# Patient Record
Sex: MALE | NOT HISPANIC OR LATINO | Employment: OTHER | URBAN - METROPOLITAN AREA
[De-identification: names, ages, dates, MRNs, and addresses within clinical notes are randomized per-mention and may not be internally consistent; named-entity substitution may affect disease eponyms.]

---

## 2017-01-10 DIAGNOSIS — Z98.1 STATUS POST ARTHRODESIS: Primary | ICD-10-CM

## 2017-01-12 ENCOUNTER — OFFICE VISIT (OUTPATIENT)
Dept: ORTHOPEDICS | Facility: CLINIC | Age: 62
End: 2017-01-12

## 2017-01-12 VITALS — WEIGHT: 266.3 LBS | BODY MASS INDEX: 36.07 KG/M2 | HEIGHT: 72 IN

## 2017-01-12 DIAGNOSIS — Z98.1 STATUS POST ARTHRODESIS: Primary | ICD-10-CM

## 2017-01-12 NOTE — MR AVS SNAPSHOT
After Visit Summary   1/12/2017    Felice Boyle    MRN: [id number]           Patient Information     Date Of Birth          1955        Visit Information        Provider Department      1/12/2017 7:40 AM Elias Quiñonez MD [facility]         Follow-ups after your visit        Your next 10 appointments already scheduled     Feb 17, 2017  8:30 AM   (Arrive by 8:15 AM)   MR THORACIC SPINE W/O CONTRAST with [id number]   [facility] ([facility])    [address] [address] [phone]           Take your medicines as usual, unless your doctor tells you not to. Bring a list of your current medicines to your exam (including vitamins, minerals and over-the-counter drugs). Also bring the results of similar scans you may have had.  Please remove any body piercings and hair extensions before you arrive.  Follow your doctor s orders. If you do not, we may have to postpone your exam.  You will not have contrast for this exam. You do not need to do anything special to prepare.  The MRI machine uses a strong magnet. Please wear clothes without metal (snaps, zippers). A sweatsuit works well, or we may give you a hospital gown.   **IMPORTANT** THE INSTRUCTIONS BELOW ARE ONLY FOR THOSE PATIENTS WHO HAVE BEEN TOLD THEY WILL RECEIVE SEDATION OR GENERAL ANESTHESIA DURING THEIR MRI PROCEDURE:  IF YOU WILL RECEIVE SEDATION (take medicine to help you relax during your exam):   You must get the medicine from your doctor before you arrive. Bring the medicine to the exam. Do not take it at home.   Arrive one hour early. Bring someone who can take you home after the test. Your medicine will make you sleepy. After the exam, you may not drive, take a bus or take a taxi by yourself.   No eating 8 hours before your exam. You may have clear liquids up until 4 hours before your exam. (Clear liquids include water, clear tea,  black coffee and fruit juice without pulp.)  IF YOU WILL RECEIVE ANESTHESIA (be asleep for your exam):   Arrive 1 1/2 hours early. Bring someone who can take you home after the test. You may not drive, take a bus or take a taxi by yourself.   No eating 8 hours before your exam. You may have clear liquids up until 4 hours before your exam. (Clear liquids include water, clear tea, black coffee and fruit juice without pulp.)   You will spend four to five hours in the recovery room.  Please call the Imaging Department at your exam site with any questions.            Feb 17, 2017  9:20 AM   (Arrive by 9:05 AM)   CT CHEST W/O CONTRAST with UCCT1   [facility] CT ([facility])    [address] [place] [address] [phone]           Please bring any scans or X-rays taken at other hospitals, if similar tests were done. Also bring a list of your medicines, including vitamins, minerals and over-the-counter drugs. It is safest to leave personal items at home.  Be sure to tell your doctor:   If you have any allergies.   If there s any chance you are pregnant.   If you are breastfeeding.   If you have any special needs.  You do not need to do anything special to prepare.  Please wear loose clothing, such as a sweat suit or jogging clothes. Avoid snaps, zippers and other metal. We may ask you to undress and put on a hospital gown.            Feb 17, 2017 10:00 AM   (Arrive by 9:45 AM)   Return Visit with Jeremy Walker MD   [facility] ([facility])    [address] [place] [id number] [phone]            Apr 18, 2017  7:40 AM   (Arrive by 7:10 AM)   RETURN SPINE with Elias Quiñonez MD   [facility] ([facility])    [address] [place] [id number] [phone]              Who to contact     Please call your  "clinic at [phone] to:    Ask questions about your health    Make or cancel appointments    Discuss your medicines    Learn about your test results    Speak to your doctor   If you have compliments or concerns about an experience at your clinic, or if you wish to file a complaint, please contact [facility] Patient Relations at [phone] or email us at [email or web address]         Additional Information About Your Visit        MyChart Information     [employer]ok[employer] gives you secure access to your electronic health record. If you see a primary care provider, you can also send messages to your care team and make appointments. If you have questions, please call your primary care clinic.  If you do not have a primary care provider, please call [phone] and they will assist you.      [employer] is an electronic gateway that provides easy, online access to your medical records. With [employer], you can request a clinic appointment, read your test results, renew a prescription or communicate with your care team.     To access your existing account, please contact your [facility] or call [phone] for assistance.        Care EveryWhere ID     This is your Care EveryWhere ID. This could be used by other organizations to access your [place] medical records  [id number]        Your Vitals Were     Height BMI (Body Mass Index)                1.816 m (5' 11.5\") 36.63 kg/m2           Blood Pressure from Last 3 Encounters:   11/16/16 134/87   10/14/16 135/76   10/06/16 150/100    Weight from Last 3 Encounters:   01/12/17 120.793 kg (266 lb 4.8 oz)   12/01/16 121.383 kg (267 lb 9.6 oz)   11/16/16 119.886 kg (264 lb 4.8 oz)              Today, you had the following     No orders found for display       Primary Care Provider Office Phone # Fax #    Saul Hahn [phone]       [facility] [address]  [facility] " [place] [address]        Thank you!     Thank you for choosing [facility]  for your care. Our goal is always to provide you with excellent care. Hearing back from our patients is one way we can continue to improve our services. Please take a few minutes to complete the written survey that you may receive in the mail after your visit with us. Thank you!             Your Updated Medication List - Protect others around you: Learn how to safely use, store and throw away your medicines at www.disposemymeds.org.      Notice  As of 1/12/2017  9:03 AM    You have not been prescribed any medications.

## 2017-01-12 NOTE — Clinical Note
Return to Work  2017     Seen today: yes    Patient:  Felice Boyle  :   1955  MRN:     [id number]  Physician: GLADIS QUIÑONEZ    Felice Boyle may return to work on Date: 2017.      The next clinic appointment is scheduled for (date/time) 3 months (6 months postoperative).    Patient limitations:    Patient now 3 months s/p tumor resection, spinal fusion T1-T3.  Doing very well.  He is now cleared to go back to work, full time and full work activities.  No restrictions.          Electronically signed by Gladis Quiñonez MD

## 2017-01-12 NOTE — Clinical Note
1/12/2017       RE: Felice Boyle  [address] [facility] [address]     Dear Colleague,    Thank you for referring your patient, Felice Boyle, to the [facility] at [facility]. Please see a copy of my visit note below.    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  Last seen at 6 wks postop.    Accompanied by his wife.  Continues to do very well.  No complaints.  Off pain meds.  Neuro intact.  Looking forward to return to work as .    Back pain 0, leg pain 0.  RICHARDSON 0/50 or 0%.  PROMIS physical 19, mental 20, total 39.    O> Neuro intact.  Ambulates independently.  Surg incision fully healed, no sign of infection.    Imaging: thoracic AP-Lat-Swimmer's show intact stable posterior fixation T2-T4.    A> 3 mos postop, doing very well.    P> Congratulated and reassured patient.    May return to work from ortho spine standpoint.  Full activities as tolerated.  RTC 3 mos (6 mos postop), with rpt thoracic ap-lat x-rays.    Elias Quiñonez MD

## 2017-01-12 NOTE — NURSING NOTE
"Reason For Visit:   Chief Complaint   Patient presents with     Surgical Followup     s/p O-Arm/Stealth Assisted Partial Vertical Body Thoracic 3, Partial spinal resection - T3 left hemilamina, pedicle, transverse process, partial vertebral body, Spinal reconstruction( s/p tumor resection) - posterior instrumented spinal fusion T2-T4, use of allograft on 10/10/2016       Primary MD: Saul Hahn  Ref. MD: Dr. Walker    ?  No  Currently working? No.  Work status?  On medical leave.  Date of injury: none  Date of surgery: 10/10/2016  Type of surgery: O-Arm/Stealth Assisted Partial Vertical Body Thoracic 3, Partial spinal resection - T3 left hemilamina, pedicle, transverse process, partial vertebral body, Spinal reconstruction( s/p tumor resection) - posterior instrumented spinal fusion T2-T4, use of allograft.  Smoker: No      Ht 1.816 m (5' 11.5\")  Wt 120.793 kg (266 lb 4.8 oz)  BMI 36.63 kg/m2    Pain Assessment  Patient Currently in Pain: No    Oswestry (RICHARDSON) Questionnaire    OSWESTRY DISABILITY INDEX 1/11/2017   SECTION 1-PAIN INTENSITY 0  I have no pain at the moment.   SECTION 2-PERSONAL CARE (WASHING,DRESSING,ETC.) 0  I can look after myself normally without causing additional pain.   SECTION 3-LIFTING 0  I can lift heavy weights without additional pain.   SECTION 4-WALKING 0  Pain does not prevent me from walking any distance.   SECTION 5-SITTING 0  I can sit in any chair as long as I like   SECTION 6-STANDING 0  I can stand as long as I want without additional pain.   SECTION 7-SLEEPING 0  My sleep is never interrupted by pain.   SECTION 8-SEX LIFE (IF APPLICABLE) 0  My sex life is normal and causes no additional pain.   SECTION 9-SOCIAL LIFE 0  My social life is normal and causes me no additional pain.   SECTION 10-TRAVELING 0  I can travel anywhere without pain.   Oswestry Disability Index: Count 10   RICHARDSON: Total Score = SUM (total for answered questions) 0   Computed Oswestry Score 0 (%) "            Numeric Rating Scale:  VAS Scores     VAS Survey 1/11/2017   What is your level of back pain during the last week: 0   What is your level of RIGHT leg pain during the last week: 0   What is your level of LEFT leg pain during the last week: 0   What is your level of neck pain during the last week: 0   What is your level of RIGHT arm pain during the last week: 0   What is your level of LEFT arm pain during the last week: 0            Promis 10 Assessment    PROMIS 10 1/11/2017   In general, would you say your health is: Very Good = 4   In general, would you say your quality of life is: Excellent = 5   In general, how would you rate your physical health? Very good = 4   In general, how would you rate your mental health, including your mood and your ability to think? Excellent = 5   In general, how would you rate your satisfaction with your social activities and relationships? Excellent = 5   In general, please rate how well you carry out your usual social activities and roles Excellent = 5   To what extent are you able to carry out your everyday physical activities such as walking, climbing stairs, carrying groceries, or moving a chair? Completely = 5   How often have you been bothered by emotional problems such as feeling anxious, depressed or irritable? Never = 5   How would you rate your fatigue on average? None = 5   How would you rate your pain on average?   0 = No Pain  to  10 = Worst Imaginable Pain 0   Global Physical Health Score : Raw Score 19 (SUM : G03 - G06 - G07 - G08)   Global Mentall Health Score : Raw Score 20 (SUM : G02 - G04 - G05 - G10)   Total (Physical + Mental Health Score) 39

## 2017-01-15 NOTE — PROGRESS NOTES
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  Last seen at 6 wks postop.    Accompanied by his wife.  Continues to do very well.  No complaints.  Off pain meds.  Neuro intact.  Looking forward to return to work as .    Back pain 0, leg pain 0.  RICHARDSON 0/50 or 0%.  PROMIS physical 19, mental 20, total 39.    O> Neuro intact.  Ambulates independently.  Surg incision fully healed, no sign of infection.    Imaging: thoracic AP-Lat-Swimmer's show intact stable posterior fixation T2-T4.    A> 3 mos postop, doing very well.    P> Congratulated and reassured patient.    May return to work from ortho spine standpoint.  Full activities as tolerated.  RTC 3 mos (6 mos postop), with rpt thoracic ap-lat x-rays.

## 2017-02-17 ENCOUNTER — OFFICE VISIT (OUTPATIENT)
Dept: ORTHOPEDICS | Facility: CLINIC | Age: 62
End: 2017-02-17

## 2017-02-17 ENCOUNTER — TELEPHONE (OUTPATIENT)
Dept: ORTHOPEDICS | Facility: CLINIC | Age: 62
End: 2017-02-17

## 2017-02-17 VITALS
HEIGHT: 72 IN | BODY MASS INDEX: 33.51 KG/M2 | DIASTOLIC BLOOD PRESSURE: 95 MMHG | SYSTOLIC BLOOD PRESSURE: 148 MMHG | WEIGHT: 247.4 LBS

## 2017-02-17 DIAGNOSIS — C41.3 CHONDROSARCOMA OF RIBS (H): Primary | ICD-10-CM

## 2017-02-17 NOTE — MR AVS SNAPSHOT
After Visit Summary   2/17/2017    Felice Boyle    MRN: [id number]           Patient Information     Date Of Birth          1955        Visit Information        Provider Department      2/17/2017 10:00 AM Jeremy Walker MD [facility]        Today's Diagnoses     Chondrosarcoma of ribs (H)    -  1       Follow-ups after your visit        Your next 10 appointments already scheduled     Apr 18, 2017  7:40 AM CDT   (Arrive by 7:10 AM)   RETURN SPINE with Elias Quiñonez MD   [facility] ([facility])    [address] [place] [id number] [phone]            May 17, 2017  9:45 AM CDT   (Arrive by 9:30 AM)   Return Visit with Jeremy Walker MD   [facility] ([facility])    [address] [place] [id number] [phone]              Who to contact     Please call your clinic at [phone] to:    Ask questions about your health    Make or cancel appointments    Discuss your medicines    Learn about your test results    Speak to your doctor   If you have compliments or concerns about an experience at your clinic, or if you wish to file a complaint, please contact [facility] Patient Relations at [phone] or email us at [email or web address]         Additional Information About Your Visit        MyChart Information     [id number] gives you secure access to your electronic health record. If you see a primary care provider, you can also send messages to your care team and make appointments. If you have questions, please call your primary care clinic.  If you do not have a primary care provider, please call [phone] and they will assist you.      [employer] is an electronic gateway that provides easy, online access to your medical records. With [employer], you can request a clinic  "appointment, read your test results, renew a prescription or communicate with your care team.     To access your existing account, please contact your [facility] or call [phone] for assistance.        Care EveryWhere ID     This is your Care EveryWhere ID. This could be used by other organizations to access your [place] medical records  [id number]        Your Vitals Were     Height BMI (Body Mass Index)                1.816 m (5' 11.5\") 34.02 kg/m2           Blood Pressure from Last 3 Encounters:   02/17/17 (!) 148/95   11/16/16 134/87   10/14/16 135/76    Weight from Last 3 Encounters:   02/17/17 112.2 kg (247 lb 6.4 oz)   01/12/17 120.8 kg (266 lb 4.8 oz)   12/01/16 121.4 kg (267 lb 9.6 oz)              Today, you had the following     No orders found for display       Primary Care Provider Office Phone # Fax #    Saul Hahn [phone]       [address] [facility] [place] [address] [place] [address]        Thank you!     Thank you for choosing [facility]  for your care. Our goal is always to provide you with excellent care. Hearing back from our patients is one way we can continue to improve our services. Please take a few minutes to complete the written survey that you may receive in the mail after your visit with us. Thank you!             Your Updated Medication List - Protect others around you: Learn how to safely use, store and throw away your medicines at www.disposemymeds.org.      Notice  As of 2/17/2017 10:24 AM    You have not been prescribed any medications.      "

## 2017-02-17 NOTE — LETTER
2/17/2017       RE: Felice Boyle  [address]  [facility] [address]     Dear Colleague,    Thank you for referring your patient, Felice Boyle, to the [facility] at [facility]. Please see a copy of my visit note below.    I was present with the PA during the history and exam.  I discussed the case with the PA and agree with the findings as documented in the assessment and plan.      OUTPATIENT PROGRESS NOTE      CHIEF COMPLAINT:  Followup history of T3 posterior rib grade 2 chondrosarcoma.      HISTORY OF PRESENT ILLNESS:  Felice is a 62-year-old male who is here with his wife today for discussion of his followup upper thoracic MRI and chest CT for monitoring of a T3 left posterior rib chondrosarcoma, grade 2.  On 10/10/2016, Moe had a left lateral third rib resection with partial T3 resection and T2 through T4 spinal fusion with allograft by Dr. Quiñonez and Dr. Walker.  The patient reports that he is doing well.  He has no pain.  His incision is well-healed.  He is not taking any pain medicine.  He has been performing all activities of daily living without difficulty.  He no longer has any lifting restrictions.  He works as a  for [employer], and has been off work since 08/2016.  He is very anxious to get back to work, and feels he is ready to do so.  There were some concerns in the hospital regarding sleep apnea and possible hypertension.  Felice reports that he has been taking his blood pressure at home, and it has been within the normal range.  He denies any snoring or gasping at night.  His wife is with him as well, and verifies that he sleeps quite well.  He denies any daytime somnolence.  His BMI today is 34.1, which is down from a BMI of 37 back in November.  Moe reports no new hospitalizations or illnesses.  He is feeling healthy and well.  He is anxious to hear the results of his scans today.  No other concerns.      PHYSICAL EXAMINATION:   Moe is a healthy-appearing 62-year-old male, alert and oriented, in no apparent distress.  We did get a blood pressure on him today, which was 148/95.  His incision is well-healed, nontender.  He has full range of motion of his extremities and neck without difficulty.  He is neurovascularly intact.      IMAGING:  CT scan of the chest performed today shows postoperative changes of resection of the posterior medial left third rib and spinal fusion hardware at T3 through T5.  No acute or aggressive appearing osseous lesion identified.  There is also a technically indeterminate 2 mm pulmonary nodule in the left lower lobe.  They recommend short-term interval followup in 3 months to confirm the stability of this nodule.      Spine MRI without contrast was also obtained today.  These images were reviewed by Dr. Walker, and official radiology read is still pending.  This shows post-surgical changes and scar around the area of the T2-T4 fusion.  No sign of recurrence of the chondrosarcoma at this point.      IMPRESSION:   1.  A 62-year-old male doing very well status post T2-T4 spinal fusion for grade 2 T3 posterior rib chondrosarcoma.   2.  Concerns of possible hypertension.   3.  No clinical signs of sleep apnea.   4.  Overweight.   5.  No current evidence of recurrence of chondrosarcoma.      PLAN:  Moe is doing very well after his surgeries.  We are very happy with his progress.  He has no restrictions, in terms of lifting or activities from our standpoint.  We would release him back to work pending his government appointed physical exam.      I see no clinical signs of sleep apnea today, other than he is overweight.  He has no other symptoms that would correspond to this.  With regards to the possibility of hypertension, typically it takes 3 blood pressure readings to diagnose that.  We have one today that is elevated.  I do recommend he follow up with his primary care physician to further assess that and treat if  needed.      Moe will follow up in 3 months for a repeat CT of the chest and repeat MRI of his spine, which is protocol.  He will continue to follow up at regular intervals up until 5 years post-surgery.  We have filled out the appropriate forms regarding his return to work.  They can call with any concerns or questions.  Dr. Walker examined this patient as well, and agrees with the plan of care.           Again, thank you for allowing me to participate in the care of your patient.      Sincerely,    Jeremy Walker MD

## 2017-02-17 NOTE — NURSING NOTE
"Reason For Visit:   Chief Complaint   Patient presents with     Surgical Followup     S/P OPTICAL TRACKING SYSTEM FUSION POSTERIOR SPINE THORACIC THREE+ LEVELS. DOS: 10/10/2016.     RECHECK     Same Day MRI and CT Scan.        Pain Assessment  Patient Currently in Pain: No               HEIGHT: 5' 11.5\", WEIGHT: 247 lbs 6.4 oz, BMI: Body mass index is 34.02 kg/(m^2).      No current outpatient prescriptions on file.        No Known Allergies    "

## 2017-02-17 NOTE — PROGRESS NOTES
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      HISTORY OF PRESENT ILLNESS:  Felice is a 62-year-old male who is here with his wife today for discussion of his followup upper thoracic MRI and chest CT for monitoring of a T3 left posterior rib chondrosarcoma, grade 2.  On 10/10/2016, Moe had a left lateral third rib resection with partial T3 resection and T2 through T4 spinal fusion with allograft by Dr. Quiñonez and Dr. Walker.  The patient reports that he is doing well.  He has no pain.  His incision is well-healed.  He is not taking any pain medicine.  He has been performing all activities of daily living without difficulty.  He no longer has any lifting restrictions.  He works as a  for [employer], and has been off work since 08/2016.  He is very anxious to get back to work, and feels he is ready to do so.  There were some concerns in the hospital regarding sleep apnea and possible hypertension.  Felice reports that he has been taking his blood pressure at home, and it has been within the normal range.  He denies any snoring or gasping at night.  His wife is with him as well, and verifies that he sleeps quite well.  He denies any daytime somnolence.  His BMI today is 34.1, which is down from a BMI of 37 back in November.  Moe reports no new hospitalizations or illnesses.  He is feeling healthy and well.  He is anxious to hear the results of his scans today.  No other concerns.      PHYSICAL EXAMINATION:  Moe is a healthy-appearing 62-year-old male, alert and oriented, in no apparent distress.  We did  get a blood pressure on him today, which was 148/95.  His incision is well-healed, nontender.  He has full range of motion of his extremities and neck without difficulty.  He is neurovascularly intact.      IMAGING:  CT scan of the chest performed today shows postoperative changes of resection of the posterior medial left third rib and spinal fusion hardware at T3 through T5.  No acute or aggressive appearing osseous lesion identified.  There is also a technically indeterminate 2 mm pulmonary nodule in the left lower lobe.  They recommend short-term interval followup in 3 months to confirm the stability of this nodule.      Spine MRI without contrast was also obtained today.  These images were reviewed by Dr. Walker, and official radiology read is still pending.  This shows post-surgical changes and scar around the area of the T2-T4 fusion.  No sign of recurrence of the chondrosarcoma at this point.      IMPRESSION:   1.  A 62-year-old male doing very well status post T2-T4 spinal fusion for grade 2 T3 posterior rib chondrosarcoma.   2.  Concerns of possible hypertension.   3.  No clinical signs of sleep apnea.   4.  Overweight.   5.  No current evidence of recurrence of chondrosarcoma.      PLAN:  Moe is doing very well after his surgeries.  We are very happy with his progress.  He has no restrictions, in terms of lifting or activities from our standpoint.  We would release him back to work pending his government appointed physical exam.      I see no clinical signs of sleep apnea today, other than he is overweight.  He has no other symptoms that would correspond to this.  With regards to the possibility of hypertension, typically it takes 3 blood pressure readings to diagnose that.  We have one today that is elevated.  I do recommend he follow up with his primary care physician to further assess that and treat if needed.      Moe will follow up in 3 months for a repeat CT of the chest and repeat MRI of his  spine, which is protocol.  He will continue to follow up at regular intervals up until 5 years post-surgery.  We have filled out the appropriate forms regarding his return to work.  They can call with any concerns or questions.  Dr. Walker examined this patient as well, and agrees with the plan of care.

## 2017-03-16 ENCOUNTER — TELEPHONE (OUTPATIENT)
Dept: ORTHOPEDICS | Facility: CLINIC | Age: 62
End: 2017-03-16

## 2017-03-16 NOTE — TELEPHONE ENCOUNTER
"----- Message from Ella Harry PA-C sent at 3/16/2017 11:45 AM CDT -----  Regarding: RE: Letter needed ASAP  Contact: [phone]  This letter should come from Dr. Trujillo at [facility], regarding the treatment and follow-up for his prostate nodules, as Dr. Walker is not following the prostate.  If Felice has not seen Dr. Trujillo, he should do so on recommendation from Dr. Lawrence originally.  Thank you.  Beti Harry PA-C      ----- Message -----     From: Amberly Patel RN     Sent: 3/15/2017  11:26 AM       To: Jeremy Walker MD, #  Subject: Letter needed ASAP                               Felice Dixon is requesting a letter addressing nodules on his prostate, \"on how critical it is and how it will be monitored\".  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  After this letter is received, it will be an additional 6 weeks until he is cleared for flying so timeliness is important.  Thank-you,  Amberly    "

## 2017-03-16 NOTE — TELEPHONE ENCOUNTER
Spoke with Felice, he should be contacting Dr. Trujillo to request letter and determine follow-up for prostate nodules.  He will do so.  We will fax the information we have regarding our follow-up and the information from Dr. Lawrence.  We will continue to follow Felice for his lung nodules related to his sarcoma follow-up.  All questions answered.

## 2017-03-30 DIAGNOSIS — C41.9 CHONDROSARCOMA (H): Primary | ICD-10-CM

## 2017-04-17 DIAGNOSIS — Z98.890 S/P SPINAL SURGERY: Primary | ICD-10-CM

## 2017-04-18 ENCOUNTER — OFFICE VISIT (OUTPATIENT)
Dept: ORTHOPEDICS | Facility: CLINIC | Age: 62
End: 2017-04-18

## 2017-04-18 VITALS — WEIGHT: 231.8 LBS | BODY MASS INDEX: 31.4 KG/M2 | HEIGHT: 72 IN

## 2017-04-18 DIAGNOSIS — Z98.1 STATUS POST ARTHRODESIS: Primary | ICD-10-CM

## 2017-04-18 NOTE — LETTER
4/18/2017       RE: Felice Boyle  [address] [facility] [address]     Dear Colleague,    Thank you for referring your patient, Felice Boyle, to the [facility] at [facility]. Please see a copy of my visit note below.    HISTORY OF PRESENT ILLNESS:  62-year-old male 6 mos s/p resection of left 3rd rib chondrosarcoma, PISF T2-T4 on 10/10/2016; last seen at 3 mos postop.      Accompanied by his wife.  He is very happy to report that he continues to do very well.  He is very much recovered from surgery.  He is very happy with surgical outcome, no sign of tumor recurrence, no significant pain symptoms.  No new complaints.  On his last visit, I cleared him to go back to work.  He has not yet gone back to work but mainly because he has not yet received the approval to go back to work from his employer.  He said it is still under review by the [employer].  He works as a .      RICHARDSON is 0%.  Back pain 0, right and left leg pain 0.  Physical score 19, mental score 20, total score 39.      PHYSICAL EXAMINATION:  On exam, patient is very pleasant, healthy-appearing, alert, oriented x3 and cooperative.  Not in cardiorespiratory distress.  BMI 31.9.  Stands and ambulates independently, normal gait, no antalgia, no imbalance.  Surgical incisions are fully healed, no sign of infection, no tenderness.  He is neurologically intact for all 4 extremities.      IMAGING:  Thoracic spine, AP, lateral x-rays show intact stable posterior instrumented fusion at T2-T4.  No sign of failure or loosening of fixation.      IMPRESSION:  Six months postoperative, doing excellent.      PLAN:  I congratulated Mr. Boyle on his continued improvement.  He has an appointment coming up next month with Dr. Walker for his tumor status followup.  He will have advanced imaging at that time.  I would like to see him back in 6 months with a thoracic CT scan for fusion  assessment.  The patient expressed good understanding and agreement.      Total visit time greater than 10 minutes, more than half spent in counseling and coordination of care.           Again, thank you for allowing me to participate in the care of your patient.      Sincerely,    Elias Quiñonez MD

## 2017-04-18 NOTE — NURSING NOTE
"Reason For Visit:   Chief Complaint   Patient presents with     Surgical Followup      s/p resection of L posterior 3rd rib chondrosarcoma, PISF T2-T4 on 10/10/16.       Primary MD: Saul Hahn. MD: established    ?  No  Currently working? No.  Work status?  On medical leave.  Date of injury: none  Date of surgery: 10/10/2016  Type of surgery: O-Arm/Stealth Assisted Partial Vertical Body Thoracic 3, Partial spinal resection - T3 left hemilamina, pedicle, transverse process, partial vertebral body, Spinal reconstruction( s/p tumor resection) - posterior instrumented spinal fusion T2-T4, use of allograft.  Smoker: No  Ht 1.816 m (5' 11.5\")  Wt 105.1 kg (231 lb 12.8 oz)  BMI 31.88 kg/m2    Pain Assessment  Patient Currently in Pain: No    Oswestry (RICHARDSON) Questionnaire    OSWESTRY DISABILITY INDEX 4/17/2017   SECTION 1-PAIN INTENSITY 0  I have no pain at the moment.   SECTION 2-PERSONAL CARE (WASHING,DRESSING,ETC.) 0  I can look after myself normally without causing additional pain.   SECTION 3-LIFTING 0  I can lift heavy weights without additional pain.   SECTION 4-WALKING 0  Pain does not prevent me from walking any distance.   SECTION 5-SITTING 0  I can sit in any chair as long as I like   SECTION 6-STANDING 0  I can stand as long as I want without additional pain.   SECTION 7-SLEEPING 0  My sleep is never interrupted by pain.   SECTION 8-SEX LIFE (IF APPLICABLE) 0  My sex life is normal and causes no additional pain.   SECTION 9-SOCIAL LIFE 0  My social life is normal and causes me no additional pain.   SECTION 10-TRAVELING 0  I can travel anywhere without pain.   Oswestry Disability Index: Count 10   RICHARDSON: Total Score = SUM (total for answered questions) 0   Computed Oswestry Score 0 (%)        Numeric Rating Scale:  VAS Scores     VAS Survey 4/17/2017   What is your level of back pain during the last week: 0   What is your level of RIGHT leg pain during the last week: 0   What is your level of " LEFT leg pain during the last week: 0   What is your level of neck pain during the last week: 0   What is your level of RIGHT arm pain during the last week: 0   What is your level of LEFT arm pain during the last week: 0        Promis 10 Assessment    PROMIS 10 4/17/2017   In general, would you say your health is: Excellent = 5   In general, would you say your quality of life is: Excellent = 5   In general, how would you rate your physical health? Very good = 4   In general, how would you rate your mental health, including your mood and your ability to think? Excellent = 5   In general, how would you rate your satisfaction with your social activities and relationships? Excellent = 5   In general, please rate how well you carry out your usual social activities and roles Excellent = 5   To what extent are you able to carry out your everyday physical activities such as walking, climbing stairs, carrying groceries, or moving a chair? Completely = 5   How often have you been bothered by emotional problems such as feeling anxious, depressed or irritable? Never = 5   How would you rate your fatigue on average? None = 5   How would you rate your pain on average?   0 = No Pain  to  10 = Worst Imaginable Pain 0   Global Physical Health Score : Raw Score 19 (SUM : G03 - G06 - G07 - G08)   Global Mentall Health Score : Raw Score 20 (SUM : G02 - G04 - G05 - G10)   Total (Physical + Mental Health Score) 39

## 2017-04-18 NOTE — MR AVS SNAPSHOT
After Visit Summary   4/18/2017    Felice Boyle    MRN: [id number]           Patient Information     Date Of Birth          1955        Visit Information        Provider Department      4/18/2017 7:40 AM Elias Quiñonez MD [facility]        Today's Diagnoses     s/p PISF T2-T4    -  1       Follow-ups after your visit        Follow-up notes from your care team     Return in about 6 months (around 10/18/2017).      Your next 10 appointments already scheduled     May 17, 2017  8:00 AM CDT   (Arrive by 7:45 AM)   CT CHEST W/O CONTRAST with [id number]   [facility] CT ([facility])    [address] [address] [phone]           Please bring any scans or X-rays taken at other hospitals, if similar tests were done. Also bring a list of your medicines, including vitamins, minerals and over-the-counter drugs. It is safest to leave personal items at home.  Be sure to tell your doctor:   If you have any allergies.   If there s any chance you are pregnant.   If you are breastfeeding.   If you have any special needs.  You do not need to do anything special to prepare.  Please wear loose clothing, such as a sweat suit or jogging clothes. Avoid snaps, zippers and other metal. We may ask you to undress and put on a hospital gown.            May 17, 2017  8:30 AM CDT   (Arrive by 8:15 AM)   MR THORACIC SPINE W/O CONTRAST with [id number]   [facility] MRI ([facility])    [address] [place] [address] [phone]           Take your medicines as usual, unless your doctor tells you not to. Bring a list of your current medicines to your exam (including vitamins, minerals and over-the-counter drugs). Also bring the results of similar scans you may have had.  Please remove any body piercings and hair extensions before you arrive.  Follow your doctor s  orders. If you do not, we may have to postpone your exam.  You will not have contrast for this exam. You do not need to do anything special to prepare.  The MRI machine uses a strong magnet. Please wear clothes without metal (snaps, zippers). A sweatsuit works well, or we may give you a hospital gown.   **IMPORTANT** THE INSTRUCTIONS BELOW ARE ONLY FOR THOSE PATIENTS WHO HAVE BEEN TOLD THEY WILL RECEIVE SEDATION OR GENERAL ANESTHESIA DURING THEIR MRI PROCEDURE:  IF YOU WILL RECEIVE SEDATION (take medicine to help you relax during your exam):   You must get the medicine from your doctor before you arrive. Bring the medicine to the exam. Do not take it at home.   Arrive one hour early. Bring someone who can take you home after the test. Your medicine will make you sleepy. After the exam, you may not drive, take a bus or take a taxi by yourself.   No eating 8 hours before your exam. You may have clear liquids up until 4 hours before your exam. (Clear liquids include water, clear tea, black coffee and fruit juice without pulp.)  IF YOU WILL RECEIVE ANESTHESIA (be asleep for your exam):   Arrive 1 1/2 hours early. Bring someone who can take you home after the test. You may not drive, take a bus or take a taxi by yourself.   No eating 8 hours before your exam. You may have clear liquids up until 4 hours before your exam. (Clear liquids include water, clear tea, black coffee and fruit juice without pulp.)   You will spend four to five hours in the recovery room.  Please call the Imaging Department at your exam site with any questions.            May 17, 2017  9:45 AM CDT   (Arrive by 9:30 AM)   Return Visit with Jeremy Walker MD   [facility] ([facility])    [address] [address] [facility]  [address] [facility] [address] [phone]              Who to contact     Please call your clinic at [phone] to:    Ask questions about your health    Make or cancel  "appointments    Discuss your medicines    Learn about your test results    Speak to your doctor   If you have compliments or concerns about an experience at your clinic, or if you wish to file a complaint, please contact [facility] Patient Relations at [phone] or email us at [email or web address]         Additional Information About Your Visit        MyChart Information     The [employer] gives you secure access to your electronic health record. If you see a primary care provider, you can also send messages to your care team and make appointments. If you have questions, please call your primary care clinic.  If you do not have a primary care provider, please call [phone] and they will assist you.      [employer] is an electronic gateway that provides easy, online access to your medical records. With [employer], you can request a clinic appointment, read your test results, renew a prescription or communicate with your care team.     To access your existing account, please contact your [facility] or call [phone] for assistance.        Care EveryWhere ID     This is your Care EveryWhere ID. This could be used by other organizations to access your [place] medical records  [id number]        Your Vitals Were     Height BMI (Body Mass Index)                1.816 m (5' 11.5\") 31.88 kg/m2           Blood Pressure from Last 3 Encounters:   02/17/17 (!) 148/95   11/16/16 134/87   10/14/16 135/76    Weight from Last 3 Encounters:   04/18/17 105.1 kg (231 lb 12.8 oz)   02/17/17 112.2 kg (247 lb 6.4 oz)   01/12/17 120.8 kg (266 lb 4.8 oz)               Primary Care Provider Office Phone # Fax #    Saul Hahn [phone]       [facility] [address]  [facility] [address]        Thank you!     Thank you for choosing [facility]  for your care. Our goal is always to provide you with excellent " care. Hearing back from our patients is one way we can continue to improve our services. Please take a few minutes to complete the written survey that you may receive in the mail after your visit with us. Thank you!             Your Updated Medication List - Protect others around you: Learn how to safely use, store and throw away your medicines at www.disposemymeds.org.      Notice  As of 4/18/2017 11:59 PM    You have not been prescribed any medications.

## 2017-04-18 NOTE — LETTER
4/18/2017      RE: Felice Boyle  [address]  [facility] [address]       HISTORY OF PRESENT ILLNESS:  62-year-old male 6 mos s/p resection of left 3rd rib chondrosarcoma, PISF T2-T4 on 10/10/2016; last seen at 3 mos postop.      Accompanied by his wife.  He is very happy to report that he continues to do very well.  He is very much recovered from surgery.  He is very happy with surgical outcome, no sign of tumor recurrence, no significant pain symptoms.  No new complaints.  On his last visit, I cleared him to go back to work.  He has not yet gone back to work but mainly because he has not yet received the approval to go back to work from his employer.  He said it is still under review by the [employer].  He works as a .      RICHARDSON is 0%.  Back pain 0, right and left leg pain 0.  Physical score 19, mental score 20, total score 39.      PHYSICAL EXAMINATION:  On exam, patient is very pleasant, healthy-appearing, alert, oriented x3 and cooperative.  Not in cardiorespiratory distress.  BMI 31.9.  Stands and ambulates independently, normal gait, no antalgia, no imbalance.  Surgical incisions are fully healed, no sign of infection, no tenderness.  He is neurologically intact for all 4 extremities.      IMAGING:  Thoracic spine, AP, lateral x-rays show intact stable posterior instrumented fusion at T2-T4.  No sign of failure or loosening of fixation.      IMPRESSION:  Six months postoperative, doing excellent.      PLAN:  I congratulated Mr. Boyle on his continued improvement.  He has an appointment coming up next month with Dr. Walker for his tumor status followup.  He will have advanced imaging at that time.  I would like to see him back in 6 months with a thoracic CT scan for fusion assessment.  The patient expressed good understanding and agreement.      Total visit time greater than 10 minutes, more than half spent in counseling and coordination of care.           Elias Quiñonez,  MD

## 2017-04-20 NOTE — PROGRESS NOTES
HISTORY OF PRESENT ILLNESS:  62-year-old male 6 mos s/p resection of left 3rd rib chondrosarcoma, PISF T2-T4 on 10/10/2016; last seen at 3 mos postop.      Accompanied by his wife.  He is very happy to report that he continues to do very well.  He is very much recovered from surgery.  He is very happy with surgical outcome, no sign of tumor recurrence, no significant pain symptoms.  No new complaints.  On his last visit, I cleared him to go back to work.  He has not yet gone back to work but mainly because he has not yet received the approval to go back to work from his employer.  He said it is still under review by the [employer].  He works as a .      RICHARDSON is 0%.  Back pain 0, right and left leg pain 0.  Physical score 19, mental score 20, total score 39.      PHYSICAL EXAMINATION:  On exam, patient is very pleasant, healthy-appearing, alert, oriented x3 and cooperative.  Not in cardiorespiratory distress.  BMI 31.9.  Stands and ambulates independently, normal gait, no antalgia, no imbalance.  Surgical incisions are fully healed, no sign of infection, no tenderness.  He is neurologically intact for all 4 extremities.      IMAGING:  Thoracic spine, AP, lateral x-rays show intact stable posterior instrumented fusion at T2-T4.  No sign of failure or loosening of fixation.      IMPRESSION:  Six months postoperative, doing excellent.      PLAN:  I congratulated Mr. Boyle on his continued improvement.  He has an appointment coming up next month with Dr. Walker for his tumor status followup.  He will have advanced imaging at that time.  I would like to see him back in 6 months with a thoracic CT scan for fusion assessment.  The patient expressed good understanding and agreement.      Total visit time greater than 10 minutes, more than half spent in counseling and coordination of care.

## 2017-05-17 ENCOUNTER — OFFICE VISIT (OUTPATIENT)
Dept: ORTHOPEDICS | Facility: CLINIC | Age: 62
End: 2017-05-17

## 2017-05-17 VITALS — BODY MASS INDEX: 31.34 KG/M2 | HEIGHT: 72 IN | WEIGHT: 231.4 LBS

## 2017-05-17 DIAGNOSIS — C41.9 CHONDROSARCOMA (H): Primary | ICD-10-CM

## 2017-05-17 NOTE — PROGRESS NOTES
This 62-year-old man is 7 months out from resection of some of his left third rib for a chondrosarcoma. He is feeling well with no complaints. He looks quite different and states that he has lost a lot of weight intentionally through cardiovascular exercise. He is still waiting to hear if his job will be restored. There been no other changes in his medical family or social history. His review of systems is unremarkable.    On examination he is alert oriented has a normal mood and affect and is in no acute distress. He has a normal gait. He has no normal sensation in his upper extremities and  Full active motion of the elbow wrist and hand dimple signs with no edema or erythema adenopathy or masses. Eyes are reactive and nonicteric and respirations are regular and unlabored. His hand is well-perfused.    I reviewed his CT scan and MRI. There is no sign of metastatic disease or local recurrence to my eye.    We will restage him again with an MRI and CT in about 4 months. I have answered all of his questions.

## 2017-05-17 NOTE — LETTER
5/17/2017       RE: Felice Boyle  [address] [facility] [address]     Dear Colleague,    Thank you for referring your patient, Felice Boyle, to the [facility]TH[facility] at [facility]. Please see a copy of my visit note below.    This 62-year-old man is 7 months out from resection of some of his left third rib for a chondrosarcoma. He is feeling well with no complaints. He looks quite different and states that he has lost a lot of weight intentionally through cardiovascular exercise. He is still waiting to hear if his job will be restored. There been no other changes in his medical family or social history. His review of systems is unremarkable.    On examination he is alert oriented has a normal mood and affect and is in no acute distress. He has a normal gait. He has no normal sensation in his upper extremities and  Full active motion of the elbow wrist and hand dimple signs with no edema or erythema adenopathy or masses. Eyes are reactive and nonicteric and respirations are regular and unlabored. His hand is well-perfused.    I reviewed his CT scan and MRI. There is no sign of metastatic disease or local recurrence to my eye.    We will restage him again with an MRI and CT in about 4 months. I have answered all of his questions.    Again, thank you for allowing me to participate in the care of your patient.      Sincerely,    Jeremy Walker MD

## 2017-05-17 NOTE — NURSING NOTE
"Reason For Visit:   Chief Complaint   Patient presents with     RECHECK     Pt. states that he is here today for folllow up on Chondrosarcoma and Same Day MRI and CT Scan.                        HEIGHT: 5' 11.5\", WEIGHT: 231 lbs 6.4 oz, BMI: Body mass index is 31.82 kg/(m^2).      No current outpatient prescriptions on file.        No Known Allergies    "

## 2017-05-17 NOTE — MR AVS SNAPSHOT
"              After Visit Summary   5/17/2017    Felice Boyle    MRN: [id number]           Patient Information     Date Of Birth          1955        Visit Information        Provider Department      5/17/2017 9:45 AM Jeremy Walker MD [facility]        Today's Diagnoses     Chondrosarcoma (H)    -  1       Follow-ups after your visit        Who to contact     Please call your clinic at [phone] to:    Ask questions about your health    Make or cancel appointments    Discuss your medicines    Learn about your test results    Speak to your doctor   If you have compliments or concerns about an experience at your clinic, or if you wish to file a complaint, please contact [facility] Patient Relations at [phone] or email us at [email or web address]         Additional Information About Your Visit        MyChart Information     [employer] gives you secure access to your electronic health record. If you see a primary care provider, you can also send messages to your care team and make appointments. If you have questions, please call your primary care clinic.  If you do not have a primary care provider, please call [phone] and they will assist you.      [employer] is an electronic gateway that provides easy, online access to your medical records. With [employer] you can request a clinic appointment, read your test results, renew a prescription or communicate with your care team.     To access your existing account, please contact your [facility] or call [phone] for assistance.        Care EveryWhere ID     This is your Care EveryWhere ID. This could be used by other organizations to access your [place] medical records  [id number]        Your Vitals Were     Height BMI (Body Mass Index)                1.816 m (5' 11.5\") 31.82 kg/m2           Blood Pressure from Last 3 Encounters:   02/17/17 (!) 148/95 "   11/16/16 134/87   10/14/16 135/76    Weight from Last 3 Encounters:   05/17/17 105 kg (231 lb 6.4 oz)   04/18/17 105.1 kg (231 lb 12.8 oz)   02/17/17 112.2 kg (247 lb 6.4 oz)              Today, you had the following     No orders found for display       Primary Care Provider Office Phone # Fax #    Saul Hahn [phone]       [address] [place] [facility] [address]        Thank you!     Thank you for choosing [facility]  for your care. Our goal is always to provide you with excellent care. Hearing back from our patients is one way we can continue to improve our services. Please take a few minutes to complete the written survey that you may receive in the mail after your visit with us. Thank you!             Your Updated Medication List - Protect others around you: Learn how to safely use, store and throw away your medicines at www.disposemymeds.org.      Notice  As of 5/17/2017  9:59 AM    You have not been prescribed any medications.

## 2017-09-22 ENCOUNTER — OFFICE VISIT (OUTPATIENT)
Dept: ORTHOPEDICS | Facility: CLINIC | Age: 62
End: 2017-09-22

## 2017-09-22 VITALS — WEIGHT: 224.1 LBS | HEIGHT: 72 IN | BODY MASS INDEX: 30.35 KG/M2

## 2017-09-22 DIAGNOSIS — C41.9 CHONDROSARCOMA (H): Primary | ICD-10-CM

## 2017-09-22 NOTE — MR AVS SNAPSHOT
After Visit Summary   9/22/2017    Felice Boyle    MRN: [id number]           Patient Information     Date Of Birth          1955        Visit Information        Provider Department      9/22/2017 9:00 AM Jeremy Walker MD [facility]        Today's Diagnoses     Chondrosarcoma (H)    -  1       Follow-ups after your visit        Your next 10 appointments already scheduled     Oct 03, 2017  8:40 AM CDT   (Arrive by 8:10 AM)   RETURN SPINE with Elias Quiñonez MD   [facility] ([facility])    [address] [place] [address] [phone]              Who to contact     Please call your clinic at [phone] to:    Ask questions about your health    Make or cancel appointments    Discuss your medicines    Learn about your test results    Speak to your doctor   If you have compliments or concerns about an experience at your clinic, or if you wish to file a complaint, please contact [facility] Patient Relations at [phone] or email us at [email or web address]         Additional Information About Your Visit        MyChart Information     [employer] gives you secure access to your electronic health record. If you see a primary care provider, you can also send messages to your care team and make appointments. If you have questions, please call your primary care clinic.  If you do not have a primary care provider, please call [phone] and they will assist you.      [employer] is an electronic gateway that provides easy, online access to your medical records. With [employer], you can request a clinic appointment, read your test results, renew a prescription or communicate with your care team.     To access your existing account, please contact your [facility] or call [phone] for assistance.        Care EveryWhere ID     This is  "your Care EveryWhere ID. This could be used by other organizations to access your [place] medical records  [id number]        Your Vitals Were     Height BMI (Body Mass Index)                1.816 m (5' 11.5\") 30.82 kg/m2           Blood Pressure from Last 3 Encounters:   02/17/17 (!) 148/95   11/16/16 134/87   10/14/16 135/76    Weight from Last 3 Encounters:   09/22/17 101.7 kg (224 lb 1.6 oz)   05/17/17 105 kg (231 lb 6.4 oz)   04/18/17 105.1 kg (231 lb 12.8 oz)              Today, you had the following     No orders found for display       Primary Care Provider Office Phone # Fax #    Saul Hahn [phone]       [address] [place] [address]        Equal Access to Services     [facility] HospitalNATASHA : Hadii aad ku javiero Somichelle, waaxda luqadaha, qaybta kaalmada adeegyada, dillon cline . So [facility] [phone].    ATENCIÓN: Si hanna esphira, tiene a jimenez disposición servicios gratuitos de asistencia lingüística. Llame al [phone].    We comply with applicable federal civil rights laws and [place] laws. We do not discriminate on the basis of race, color, national origin, age, disability sex, sexual orientation or gender identity.            Thank you!     Thank you for choosing [facility]OP[facility]  for your care. Our goal is always to provide you with excellent care. Hearing back from our patients is one way we can continue to improve our services. Please take a few minutes to complete the written survey that you may receive in the mail after your visit with us. Thank you!             Your Updated Medication List - Protect others around you: Learn how to safely use, store and throw away your medicines at www.disposemymeds.org.      Notice  As of 9/22/2017 11:59 PM    You have not been prescribed any medications.      "

## 2017-09-22 NOTE — NURSING NOTE
"Reason For Visit:   Chief Complaint   Patient presents with     RECHECK     Pt. states that he is here today for folllow up on Chondrosarcoma and Same Day MRI and CT Scan.                        HEIGHT: 5' 11.5\", WEIGHT: 224 lbs 1.6 oz, BMI: Body mass index is 30.82 kg/(m^2).      No current outpatient prescriptions on file.        No Known Allergies            "

## 2017-09-22 NOTE — PROGRESS NOTES
Diagnosis: Left third rib chondrosarcoma  Surgery:   10/12/2016 Wide resection left third rib chondrosarcoma, T2-T4 posterior spinal fusion, Jaylan Walker and Khang    History of present illness:  62-year-old male approximately 11 months out from the above listed surgery. He reports he is doing very well overall. He has returned to working as a  for [employer] since we saw him last, he reports this is going well. He denies any ongoing chest wall or back pain. He does not take any pain medications.    Physical examination:  Posterior spine incision well-healed, no evidence of nodules or palpable masses in the area of the prior tumor. Full strength and sensation in bilateral lower extremities.    Imaging:  Spine MRI reviewed, post surgical changes present with no evidence of recurrence  CT chest reviewed, no evidence of lung nodules    Assessment: 11 months status post wide resection of a left third rib chondrosarcoma. Here for routine surveillance, no evidence of recurrence or metastasis.  Plan:  1. Activity as tolerated  2. Paperwork filled out for work  3. Return to clinic in approximately 4 months for repeat spine MRI and CT of the chest at that time.  4. No medications prescribed. Prognosis good.    Patient seen and evaluated with Dr. Walker who agrees with the above assessment and plan.    Answers for HPI/ROS submitted by the patient on 9/21/2017   General Symptoms: No  Skin Symptoms: No  HENT Symptoms: No  EYE SYMPTOMS: No  HEART SYMPTOMS: No  LUNG SYMPTOMS: No  INTESTINAL SYMPTOMS: No  URINARY SYMPTOMS: No  REPRODUCTIVE SYMPTOMS: No  SKELETAL SYMPTOMS: No  BLOOD SYMPTOMS: No  NERVOUS SYSTEM SYMPTOMS: No  MENTAL HEALTH SYMPTOMS: No

## 2017-09-22 NOTE — LETTER
9/22/2017       RE: Felice Boyle  [address] [facility] [address]     Dear Colleague,    Thank you for referring your patient, Felice Boyle, to the [facility]THOP[facility] at [facility]. Please see a copy of my visit note below.    I was present with the resident during the history and exam.  I discussed the case with the resident and agree with the findings as documented in the assessment and plan.    Diagnosis: Left third rib chondrosarcoma  Surgery:   10/12/2016 Wide resection left third rib chondrosarcoma, T2-T4 posterior spinal fusion, Jaylan Walker and Khang    History of present illness:  62-year-old male approximately 11 months out from the above listed surgery. He reports he is doing very well overall. He has returned to working as a  for [employer] since we saw him last, he reports this is going well. He denies any ongoing chest wall or back pain. He does not take any pain medications.    Physical examination:  Posterior spine incision well-healed, no evidence of nodules or palpable masses in the area of the prior tumor. Full strength and sensation in bilateral lower extremities.    Imaging:  Spine MRI reviewed, post surgical changes present with no evidence of recurrence  CT chest reviewed, no evidence of lung nodules    Assessment: 11 months status post wide resection of a left third rib chondrosarcoma. Here for routine surveillance, no evidence of recurrence or metastasis.  Plan:  1. Activity as tolerated  2. Paperwork filled out for work  3. Return to clinic in approximately 4 months for repeat spine MRI and CT of the chest at that time.  4. No medications prescribed. Prognosis good.    Patient seen and evaluated with Dr. Walker who agrees with the above assessment and plan.      Again, thank you for allowing me to participate in the care of your patient.      Sincerely,    Jeremy Walker MD

## 2017-09-26 ENCOUNTER — TELEPHONE (OUTPATIENT)
Dept: ORTHOPEDICS | Facility: CLINIC | Age: 62
End: 2017-09-26

## 2017-09-26 DIAGNOSIS — Z98.890 HX OF SPINAL SURGERY: Primary | ICD-10-CM

## 2017-10-03 ENCOUNTER — OFFICE VISIT (OUTPATIENT)
Dept: ORTHOPEDICS | Facility: CLINIC | Age: 62
End: 2017-10-03

## 2017-10-03 DIAGNOSIS — Z98.1 STATUS POST ARTHRODESIS: Primary | ICD-10-CM

## 2017-10-03 NOTE — MR AVS SNAPSHOT
After Visit Summary   10/3/2017    Felice Boyle    MRN: [id number]           Patient Information     Date Of Birth          1955        Visit Information        Provider Department      10/3/2017 8:40 AM Elias Quiñonez MD [facility]        Today's Diagnoses     s/p PISF T2-T4    -  1       Follow-ups after your visit        Follow-up notes from your care team     Return if symptoms worsen or fail to improve.      Who to contact     Please call your clinic at [phone] to:    Ask questions about your health    Make or cancel appointments    Discuss your medicines    Learn about your test results    Speak to your doctor   If you have compliments or concerns about an experience at your clinic, or if you wish to file a complaint, please contact [facility] Patient Relations at [phone] or email us at [email or web address]         Additional Information About Your Visit        MyChart Information     [employer] gives you secure access to your electronic health record. If you see a primary care provider, you can also send messages to your care team and make appointments. If you have questions, please call your primary care clinic.  If you do not have a primary care provider, please call [phone] and they will assist you.      [employer] is an electronic gateway that provides easy, online access to your medical records. With [employer], you can request a clinic appointment, read your test results, renew a prescription or communicate with your care team.     To access your existing account, please contact your [facility] or call [phone] for assistance.        Care EveryWhere ID     This is your Care EveryWhere ID. This could be used by other organizations to access your [place] medical records  [id number]         Blood Pressure from Last 3 Encounters:   02/17/17 (!) 148/95   11/16/16 134/87    10/14/16 135/76    Weight from Last 3 Encounters:   09/22/17 101.7 kg (224 lb 1.6 oz)   05/17/17 105 kg (231 lb 6.4 oz)   04/18/17 105.1 kg (231 lb 12.8 oz)              Today, you had the following     No orders found for display       Primary Care Provider Office Phone # Fax #    Saul Hahn [phone]       [address] [place] [address]        Equal Access to Services     [facility]: Hadii aad ku hadasho Soomaali, waaxda luqadaha, qaybta kaalmada adeegyada, dillon cline . So [facility] [phone].    ATENCIÓN: Si habla español, tiene a jimenez disposición servicios gratuitos de asistencia lingüística. [facility] [phone].    We comply with applicable federal civil rights laws and [place] laws. We do not discriminate on the basis of race, color, national origin, age, disability, sex, sexual orientation, or gender identity.            Thank you!     Thank you for choosing [facility] ORTHOPAEDIC CLINIC  for your care. Our goal is always to provide you with excellent care. Hearing back from our patients is one way we can continue to improve our services. Please take a few minutes to complete the written survey that you may receive in the mail after your visit with us. Thank you!             Your Updated Medication List - Protect others around you: Learn how to safely use, store and throw away your medicines at www.disposemymeds.org.      Notice  As of 10/3/2017 10:13 AM    You have not been prescribed any medications.

## 2017-10-03 NOTE — LETTER
Return to Work  October 3, 2017     Seen today: yes    Patient:  Felice Boyle  :   1955  MRN:     [id number]  Physician: GLADIS QUIÑONEZ    Felice Boyle may continue full-time work.      The next clinic appointment is scheduled for (date/time) prn (as needed).    Patient limitations:    Now 1 yr s/p resection of chondrosarcoma 3rd rib, and posterior spinal fusion T2-T4.  Doing very well; asymptomatic.    NO sign of sarcoma recurrence.  Stable fixation, no sign of loosening.  May continue working without restrictions, from spine standpoint.            Electronically signed by Gladis Quiñonez MD

## 2017-10-03 NOTE — LETTER
10/3/2017      RE: Felice Boyle  [address]  [facility] [address]       Reason for Visit:  Chief Complaint   Patient presents with     Surgical Followup     Annual for resection of left 3rd rib chondrosarcoma, PISF T2-T4 on 10/10/2016.       S>   62/m, 1 yr postop.  Accompanied by wife.  Last seen at 6 mos postop.    Continues to do very well.  Back to fulltime work as  for [employer].  No complaints.  Doing full activities.  Continues to f/u with oncologist mainly for prostate CA.  Continues to see Dr. KINDRA Walker, last seen yesterday, everything looks good, next visit in 4 months.  No sign of recurrence.      Oswestry (RICHARDSON) Questionnaire    OSWESTRY DISABILITY INDEX 10/3/2017   SECTION 1-PAIN INTENSITY I have no pain at the moment.   SECTION 2-PERSONAL CARE (WASHING,DRESSING,ETC.) I can look after myself normally without causing additional pain.   SECTION 3-LIFTING I can lift heavy weights without additional pain.   SECTION 4-WALKING Pain does not prevent me from walking any distance.   SECTION 5-SITTING I can sit in any chair as long as I like.   SECTION 6-STANDING I can stand as long as I want without additional pain.   SECTION 7-SLEEPING My sleep is never interrupted by pain.   SECTION 8-SEX LIFE (IF APPLICABLE) My sex life is normal and causes no additional pain.   SECTION 9-SOCIAL LIFE My social life is normal and causes me no additional pain.   SECTION 10-TRAVELING I can travel anywhere without pain.   Oswestry Disability Index: Count 10   RICHARDSON: Total Score = SUM (total for answered questions) 0   Computed Oswestry Score 0 (%)   Some recent data might be hidden          Numeric Rating Scale:  VAS Scores     VAS Survey 10/3/2017   What is your level of back pain during the last week: 0   What is your level of RIGHT leg pain during the last week: 0   What is your level of LEFT leg pain during the last week: 0   What is your level of neck pain during the last week: 0   What is your  level of RIGHT arm pain during the last week: 0   What is your level of LEFT arm pain during the last week: 0        PROMIS-10 Scores  Global Mental Health Score: (P) 20  Global Physical Health Score: (P) 19  PROMIS TOTAL - SUBSCORES: (P) 39    O>   Alert, oriented x 3, cooperative.  Not in CP distress.  There were no vitals taken for this visit.  Surgical incision well-healed, no sign of infection.  Ambulates independently.   Grossly neurologically intact, both UE's and both LE's    Imaging: Thoracic CT done today shows no clear bridging bone across T2-T4, but with very stable fixation; no sign of loosening or failure.      A>  1 yr postop, doing very well, no clear bridging bone on CT, but with stable fixation      P>   Congratulated and reassured patient.  No need to do anything at this point.  Continue full activities, continue fulltime  work.  Letter given.  In future, if screws start to loosen, may begin to have recurrence of upper back pain.  However, it is also very likely that this will never happen and he will continue to be asymptomatic.  RTC prn.  Expressed good understanding and agreement.    TT > 10 mins, > 50% CC.      Elias Quiñonez MD    Orthopaedic Spine Surgery  Dept Orthopaedic Surgery, [facility]  [phone] office, [phone] pager  [email or web address]

## 2017-10-03 NOTE — NURSING NOTE
Reason For Visit:   Chief Complaint   Patient presents with     Surgical Followup     Annual for resection of left 3rd rib chondrosarcoma, PISF T2-T4 on 10/10/2016.       Primary MD: Saul Hahn  Ref. MD: Established    ?  No  Occupation .  Currently working? Yes.  Work status?  Full time.  Date of injury: none  Date of surgery: 10/10/2016  Type of surgery: O-Arm/Stealth Assisted Partial Vertical Body Thoracic 3, Partial spinal resection - T3 left hemilamina, pedicle, transverse process, partial vertebral body, Spinal reconstruction( s/p tumor resection) - posterior instrumented spinal fusion T2-T4, use of allograft.  Smoker: No      Pain Assessment  Patient Currently in Pain: No    Oswestry (RICHARDSON) Questionnaire    OSWESTRY DISABILITY INDEX 10/3/2017   SECTION 1-PAIN INTENSITY I have no pain at the moment.   SECTION 2-PERSONAL CARE (WASHING,DRESSING,ETC.) I can look after myself normally without causing additional pain.   SECTION 3-LIFTING I can lift heavy weights without additional pain.   SECTION 4-WALKING Pain does not prevent me from walking any distance.   SECTION 5-SITTING I can sit in any chair as long as I like.   SECTION 6-STANDING I can stand as long as I want without additional pain.   SECTION 7-SLEEPING My sleep is never interrupted by pain.   SECTION 8-SEX LIFE (IF APPLICABLE) My sex life is normal and causes no additional pain.   SECTION 9-SOCIAL LIFE My social life is normal and causes me no additional pain.   SECTION 10-TRAVELING I can travel anywhere without pain.   Oswestry Disability Index: Count 10   RICHARDSON: Total Score = SUM (total for answered questions) 0   Computed Oswestry Score 0 (%)   Some recent data might be hidden        Visual Analog Pain Scale  Back Pain Scale 0-10: (P) 0  Right leg pain: (P) 0  Left leg pain: (P) 0  Neck Pain Scale 0-10: (P) 0  Right arm pain: (P) 0  Left arm pain: (P) 0    Promis 10 Assessment    PROMIS 10 10/3/2017   In general, would you say your  health is: Excellent   In general, would you say your quality of life is: Excellent   In general, how would you rate your physical health? Very good   In general, how would you rate your mental health, including your mood and your ability to think? Excellent   In general, how would you rate your satisfaction with your social activities and relationships? Excellent   In general, please rate how well you carry out your usual social activities and roles Excellent   To what extent are you able to carry out your everyday physical activities such as walking, climbing stairs, carrying groceries, or moving a chair? Completely   How often have you been bothered by emotional problems such as feeling anxious, depressed or irritable? Never   How would you rate your fatigue on average? None   How would you rate your pain on average?   0 = No Pain  to  10 = Worst Imaginable Pain 0   Global Physical Health Score : Raw Score 19 (SUM : G03 - G06 - G07 - G08)   Global Mental Health Score : Raw Score 20 (SUM : G02 - G04 - G05 - G10)   Total (Physical + Mental Health Score) 39   In general, would you say your health is: 5   In general, would you say your quality of life is: 5   In general, how would you rate your physical health? 4   In general, how would you rate your mental health, including your mood and your ability to think? 5   In general, how would you rate your satisfaction with your social activities and relationships? 5   In general, please rate how well you carry out your usual social activities and roles. (This includes activities at home, at work and in your community, and responsibilities as a parent, child, spouse, employee, friend, etc.) 5   To what extent are you able to carry out your everyday physical activities such as walking, climbing stairs, carrying groceries, or moving a chair? 5   In the past 7 days, how often have you been bothered by emotional problems such as feeling anxious, depressed, or irritable? 5   In  the past 7 days, how would you rate your fatigue on average? 5   In the past 7 days, how would you rate your pain on average, where 0 means no pain, and 10 means worst imaginable pain? 0   Global Mental Health Score 20   Global Physical Health Score 19   PROMIS TOTAL - SUBSCORES 39   Some recent data might be hidden                Padma Fisher LPN

## 2017-10-03 NOTE — PROGRESS NOTES
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       S>   62/m, 1 yr postop.  Accompanied by wife.  Last seen at 6 mos postop.    Continues to do very well.  Back to fulltime work as  for [employer].  No complaints.  Doing full activities.  Continues to f/u with oncologist mainly for prostate CA.  Continues to see Dr. KINDRA Walker, last seen yesterday, everything looks good, next visit in 4 months.  No sign of recurrence.      Oswestry (RICHARDSON) Questionnaire    OSWESTRY DISABILITY INDEX 10/3/2017   SECTION 1-PAIN INTENSITY I have no pain at the moment.   SECTION 2-PERSONAL CARE (WASHING,DRESSING,ETC.) I can look after myself normally without causing additional pain.   SECTION 3-LIFTING I can lift heavy weights without additional pain.   SECTION 4-WALKING Pain does not prevent me from walking any distance.   SECTION 5-SITTING I can sit in any chair as long as I like.   SECTION 6-STANDING I can stand as long as I want without additional pain.   SECTION 7-SLEEPING My sleep is never interrupted by pain.   SECTION 8-SEX LIFE (IF APPLICABLE) My sex life is normal and causes no additional pain.   SECTION 9-SOCIAL LIFE My social life is normal and causes me no additional pain.   SECTION 10-TRAVELING I can travel anywhere without pain.   Oswestry Disability Index: Count 10   RICHARDSON: Total Score = SUM (total for answered questions) 0   Computed Oswestry Score 0 (%)   Some recent data might be hidden          Numeric Rating Scale:  VAS Scores     VAS Survey 10/3/2017   What is your level of back pain during the last week: 0   What is your level of RIGHT leg pain during the last week: 0   What is your level of LEFT leg pain during the last week: 0   What is your level of neck pain during the last week: 0   What is your level of RIGHT arm pain during the last week: 0   What is your level of LEFT arm  pain during the last week: 0        PROMIS-10 Scores  Global Mental Health Score: (P) 20  Global Physical Health Score: (P) 19  PROMIS TOTAL - SUBSCORES: (P) 39    O>   Alert, oriented x 3, cooperative.  Not in CP distress.  There were no vitals taken for this visit.  Surgical incision well-healed, no sign of infection.  Ambulates independently.   Grossly neurologically intact, both UE's and both LE's    Imaging: Thoracic CT done today shows no clear bridging bone across T2-T4, but with very stable fixation; no sign of loosening or failure.      A>  1 yr postop, doing very well, no clear bridging bone on CT, but with stable fixation      P>   Congratulated and reassured patient.  No need to do anything at this point.  Continue full activities, continue fulltime  work.  Letter given.  In future, if screws start to loosen, may begin to have recurrence of upper back pain.  However, it is also very likely that this will never happen and he will continue to be asymptomatic.  RTC prn.  Expressed good understanding and agreement.    TT > 10 mins, > 50% CC.      Elias Quiñonez MD    Orthopaedic Spine Surgery  Dept Orthopaedic Surgery, [facility]  [phone] office, [phone] pager  [email or web address]

## 2017-12-22 DIAGNOSIS — C41.9 CHONDROSARCOMA (H): Primary | ICD-10-CM

## 2018-01-24 ENCOUNTER — RADIANT APPOINTMENT (OUTPATIENT)
Dept: CT IMAGING | Facility: CLINIC | Age: 63
End: 2018-01-24
Attending: ORTHOPAEDIC SURGERY
Payer: COMMERCIAL

## 2018-01-24 ENCOUNTER — RADIANT APPOINTMENT (OUTPATIENT)
Dept: MRI IMAGING | Facility: CLINIC | Age: 63
End: 2018-01-24
Attending: ORTHOPAEDIC SURGERY
Payer: COMMERCIAL

## 2018-01-24 ENCOUNTER — OFFICE VISIT (OUTPATIENT)
Dept: ORTHOPEDICS | Facility: CLINIC | Age: 63
End: 2018-01-24
Payer: COMMERCIAL

## 2018-01-24 VITALS — HEIGHT: 72 IN | WEIGHT: 240 LBS | BODY MASS INDEX: 32.51 KG/M2

## 2018-01-24 DIAGNOSIS — C41.9 CHONDROSARCOMA (H): Primary | ICD-10-CM

## 2018-01-24 DIAGNOSIS — C41.9 CHONDROSARCOMA (H): ICD-10-CM

## 2018-01-24 LAB — RADIOLOGIST FLAGS: NORMAL

## 2018-01-24 RX ORDER — GADOBUTROL 604.72 MG/ML
10 INJECTION INTRAVENOUS ONCE
Status: COMPLETED | OUTPATIENT
Start: 2018-01-24 | End: 2018-01-24

## 2018-01-24 RX ADMIN — GADOBUTROL 10 ML: 604.72 INJECTION INTRAVENOUS at 09:33

## 2018-01-24 NOTE — DISCHARGE INSTRUCTIONS
MRI Contrast Discharge Instructions    The IV contrast you received today will pass out of your body in your  urine. This will happen in the next 24 hours. You will not feel this process.  Your urine will not change color.    Drink at least 4 extra glasses of water or juice today (unless your doctor  has restricted your fluids). This reduces the stress on your kidneys.  You may take your regular medicines.    If you are on dialysis: It is best to have dialysis today.    If you have a reaction: Most reactions happen right away. If you have  any new symptoms after leaving the hospital (such as hives or swelling),  call your hospital at the correct number below. Or call your family doctor.  If you have breathing distress or wheezing, call 911.    Special instructions: ***    I have read and understand the above information.    Signature:______________________________________ Date:___________    Staff:__________________________________________ Date:___________     Time:__________    [facility] Departments:    ___[place]: [phone]  ___[facility]: [phone]  ___[place]: [phone] ___[facility]: [phone]  ___[facility]: [phone]  ___[facility]: [phone]  ___[phone]  ___[facility]: [phone]  ___[phone]

## 2018-01-24 NOTE — MR AVS SNAPSHOT
After Visit Summary   1/24/2018    Felice Boyle    MRN: [id number]           Patient Information     Date Of Birth          1955        Visit Information        Provider Department      1/24/2018 9:30 AM Jeremy Walker MD [facility]        Today's Diagnoses     Chondrosarcoma (H)    -  1       Follow-ups after your visit        Future tests that were ordered for you today     Open Future Orders        Priority Expected Expires Ordered    CT Chest w/o contrast Routine  1/24/2019 1/24/2018    MRI Thoracic spine w & w/o contrast Routine  1/24/2019 1/24/2018            Who to contact     Please call your clinic at [phone] to:    Ask questions about your health    Make or cancel appointments    Discuss your medicines    Learn about your test results    Speak to your doctor   If you have compliments or concerns about an experience at your clinic, or if you wish to file a complaint, please contact [facility] Patient Relations at [phone] or email us at [email or web address]         Additional Information About Your Visit        Canopihart Information     Phybridge gives you secure access to your electronic health record. If you see a primary care provider, you can also send messages to your care team and make appointments. If you have questions, please call your primary care clinic.  If you do not have a primary care provider, please call [phone] and they will assist you.      Phybridge is an electronic gateway that provides easy, online access to your medical records. With Phybridge, you can request a clinic appointment, read your test results, renew a prescription or communicate with your care team.     To access your existing account, please contact your [facility] or call [phone] for assistance.        Care EveryWhere ID     This is your Care EveryWhere ID. This could be used by other  "organizations to access your [facility] medical records  [id number]        Your Vitals Were     Height BMI (Body Mass Index)                1.816 m (5' 11.5\") 33.01 kg/m2           Blood Pressure from Last 3 Encounters:   02/17/17 (!) 148/95   11/16/16 134/87   10/14/16 135/76    Weight from Last 3 Encounters:   01/24/18 108.9 kg (240 lb)   09/22/17 101.7 kg (224 lb 1.6 oz)   05/17/17 105 kg (231 lb 6.4 oz)               Primary Care Provider Office Phone # Fax #    Saul Hahn [phone]       [address] [place] [address] [place] [address]        Equal Access to Services     BOOGIE [facility] HospitalNATASHA : Hadii aad ku hadasho Soomaali, waaxda luqadaha, qaybta kaalmada adeegyada, dillon cline . So [facility] [phone].    ATENCIÓN: Si habla español, tiene a jimenez disposición servicios gratuitos de asistencia lingüística. Jacob al [phone].    We comply with applicable federal civil rights laws and [place] laws. We do not discriminate on the basis of race, color, national origin, age, disability, sex, sexual orientation, or gender identity.            Thank you!     Thank you for choosing [facility]  for your care. Our goal is always to provide you with excellent care. Hearing back from our patients is one way we can continue to improve our services. Please take a few minutes to complete the written survey that you may receive in the mail after your visit with us. Thank you!             Your Updated Medication List - Protect others around you: Learn how to safely use, store and throw away your medicines at www.disposemymeds.org.      Notice  As of 1/24/2018  3:03 PM    You have not been prescribed any medications.      "

## 2018-01-24 NOTE — LETTER
1/24/2018       RE: Felice Boyle  [address] [facility] [address]     Dear Colleague,    Thank you for referring your patient, Felice Boyle, to the [facility] at [facility]. Please see a copy of my visit note below.    This 62-year-old man is 15 months out from removal of a left rib chondrosarcoma at T3.  He has been well and has been able to return to work.  There have been no changes in his activity.  He has minimal discomfort.  There have been no other changes in his medical family or social history.  I have reviewed his patient surveys in the EMR.    On examination he is alert oriented has a normal mood and affect and is in no acute distress.  Respirations are regular and unlabored.  Eyes are nonicteric with equal pupils.  Upper extremity function is unrestricted.  He has no vascular or neurologic compromise in his hands.  He is able to ambulate without difficulty.    I reviewed his MRI and CT scan.  His MRI does not show any sign of local recurrence to my eye.  The CT scan shows some apparent inflammatory change in the left lung that would be consistent with his recent upper respiratory infection of about 2 weeks ago.    This patient does not have any sign of metastatic disease or local recurrence in my view.  He may continue with his regular activities including work without restrictions.  He will return in 3 months with a repeat MRI and CT scan of the chest.    Again, thank you for allowing me to participate in the care of your patient.      Sincerely,    Jeremy Walker MD

## 2018-01-24 NOTE — NURSING NOTE
"Reason For Visit:   Chief Complaint   Patient presents with     RECHECK     F/U Chondrosarcoma. Same Day MRI and CT Scan.       Pain Assessment  Patient Currently in Pain: No               HEIGHT: 5' 11.5\", WEIGHT: 240 lbs 0 oz, BMI: Body mass index is 33.01 kg/(m^2).      No current outpatient prescriptions on file.        No Known Allergies          ALISSA, ESTUS, CMA    "

## 2018-01-24 NOTE — PROGRESS NOTES
This 62-year-old man is 15 months out from removal of a left rib chondrosarcoma at T3.  He has been well and has been able to return to work.  There have been no changes in his activity.  He has minimal discomfort.  There have been no other changes in his medical family or social history.  I have reviewed his patient surveys in the EMR.    On examination he is alert oriented has a normal mood and affect and is in no acute distress.  Respirations are regular and unlabored.  Eyes are nonicteric with equal pupils.  Upper extremity function is unrestricted.  He has no vascular or neurologic compromise in his hands.  He is able to ambulate without difficulty.    I reviewed his MRI and CT scan.  His MRI does not show any sign of local recurrence to my eye.  The CT scan shows some apparent inflammatory change in the left lung that would be consistent with his recent upper respiratory infection of about 2 weeks ago.    This patient does not have any sign of metastatic disease or local recurrence in my view.  He may continue with his regular activities including work without restrictions.  He will return in 3 months with a repeat MRI and CT scan of the chest.

## 2018-01-25 DIAGNOSIS — C41.3 CHONDROSARCOMA OF RIBS (H): Primary | ICD-10-CM

## 2018-01-25 DIAGNOSIS — Z53.9 ERRONEOUS ENCOUNTER--DISREGARD: ICD-10-CM

## 2018-04-04 ENCOUNTER — RADIANT APPOINTMENT (OUTPATIENT)
Dept: CT IMAGING | Facility: CLINIC | Age: 63
End: 2018-04-04
Attending: ORTHOPAEDIC SURGERY
Payer: COMMERCIAL

## 2018-04-04 ENCOUNTER — OFFICE VISIT (OUTPATIENT)
Dept: ORTHOPEDICS | Facility: CLINIC | Age: 63
End: 2018-04-04
Payer: COMMERCIAL

## 2018-04-04 ENCOUNTER — RADIANT APPOINTMENT (OUTPATIENT)
Dept: MRI IMAGING | Facility: CLINIC | Age: 63
End: 2018-04-04
Attending: ORTHOPAEDIC SURGERY
Payer: COMMERCIAL

## 2018-04-04 VITALS — HEIGHT: 72 IN | BODY MASS INDEX: 32.41 KG/M2 | WEIGHT: 239.3 LBS

## 2018-04-04 DIAGNOSIS — C41.9 CHONDROSARCOMA (H): ICD-10-CM

## 2018-04-04 DIAGNOSIS — C41.9 CHONDROSARCOMA (H): Primary | ICD-10-CM

## 2018-04-04 RX ORDER — GADOBUTROL 604.72 MG/ML
10 INJECTION INTRAVENOUS ONCE
Status: COMPLETED | OUTPATIENT
Start: 2018-04-04 | End: 2018-04-04

## 2018-04-04 RX ADMIN — GADOBUTROL 10 ML: 604.72 INJECTION INTRAVENOUS at 08:50

## 2018-04-04 NOTE — MR AVS SNAPSHOT
"              After Visit Summary   4/4/2018    Felice Boyle    MRN: [id number]           Patient Information     Date Of Birth          1955        Visit Information        Provider Department      4/4/2018 9:30 AM Jeremy Walker MD [facility]        Today's Diagnoses     Chondrosarcoma (H)    -  1       Follow-ups after your visit        Who to contact     Please call your clinic at [phone] to:    Ask questions about your health    Make or cancel appointments    Discuss your medicines    Learn about your test results    Speak to your doctor            Additional Information About Your Visit        MyChart Information     SpokenLayer gives you secure access to your electronic health record. If you see a primary care provider, you can also send messages to your care team and make appointments. If you have questions, please call your primary care clinic.  If you do not have a primary care provider, please call [phone] and they will assist you.      SpokenLayer is an electronic gateway that provides easy, online access to your medical records. With SpokenLayer, you can request a clinic appointment, read your test results, renew a prescription or communicate with your care team.     To access your existing account, please contact your [facility] or call [phone] for assistance.        Care EveryWhere ID     This is your Care EveryWhere ID. This could be used by other organizations to access your [place] medical records  [id number]        Your Vitals Were     Height BMI (Body Mass Index)                1.816 m (5' 11.5\") 32.91 kg/m2           Blood Pressure from Last 3 Encounters:   02/17/17 (!) 148/95   11/16/16 134/87   10/14/16 135/76    Weight from Last 3 Encounters:   04/04/18 108.5 kg (239 lb 4.8 oz)   01/24/18 108.9 kg (240 lb)   09/22/17 101.7 kg (224 lb 1.6 oz)              Today, you had the following     No orders found for display       " Primary Care Provider Office Phone # Fax #    Saul Hahn [phone]       [address] [facility] [address]        Equal Access to Services     DEJUAN CASTANO : Hadii jennifer ku hadjalilo Soomaali, waaxda luqadaha, qaybta kaalmada adeegyada, waxay idiin haypericon adegriselda bruce marlyn fraga. So [facility] [phone].    ATENCIÓN: Si habla español, tiene a jimenez disposición servicios gratuitos de asistencia lingüística. Llame al [phone].    We comply with applicable federal civil rights laws and [place] laws. We do not discriminate on the basis of race, color, national origin, age, disability, sex, sexual orientation, or gender identity.            Thank you!     Thank you for choosing [facility]  for your care. Our goal is always to provide you with excellent care. Hearing back from our patients is one way we can continue to improve our services. Please take a few minutes to complete the written survey that you may receive in the mail after your visit with us. Thank you!             Your Updated Medication List - Protect others around you: Learn how to safely use, store and throw away your medicines at www.disposemymeds.org.      Notice  As of 4/4/2018 10:51 AM    You have not been prescribed any medications.

## 2018-04-04 NOTE — LETTER
2018       RE: Felice Boyle  [address] [facility] [address]     Dear Colleague,    Thank you for referring your patient, Felice Boyle, to the [facility] at [facility]. Please see a copy of my visit note below.    I was present with the resident during the history and exam.  I discussed the case with the resident and agree with the findings as documented in the assessment and plan.    Chief Concern/Diagnosis: Left third rib chondrosarcoma    Prior Surgeries:    1. 2016 resection of left third rib and left T3 pedicle with fusion of adjacent vertebrae    Subjective:  Doing very well.  Has returned to full activity and work, including shoveling snow.  No concerns.  Happy with how things are going.      Physical Examination:    Gen:  Alert, no acute distress, well nourished, appears stated age    Psych:  Normal affect, nonpressured speech    Musculoskeletal:  Examination of the surgical site shows a well-healed incision.  No wound complications present.      Imaging:  Independent review of 2018 CT and MRI chest shows no concerns for metastatic disease to the lungs.  No evidence of recurrence of a chondrosarcoma locally.      Assessment:   63 year old male with the followin. 18 months status post left third rib chondrosarcoma resection.  No evidence of recurrence.      Recommendations:  1. Continue with every 3 months CT and MRI scan until 2 year christy.      Seen with Dr. Walker, documentation by:    Syed Shook MD  Orthopaedic Surgery PGY-4       Again, thank you for allowing me to participate in the care of your patient.      Sincerely,    Jeremy Walker MD

## 2018-04-04 NOTE — DISCHARGE INSTRUCTIONS
MRI Contrast Discharge Instructions    The IV contrast you received today will pass out of your body in your  urine. This will happen in the next 24 hours. You will not feel this process.  Your urine will not change color.    Drink at least 4 extra glasses of water or juice today (unless your doctor  has restricted your fluids). This reduces the stress on your kidneys.  You may take your regular medicines.    If you are on dialysis: It is best to have dialysis today.    If you have a reaction: Most reactions happen right away. If you have  any new symptoms after leaving the hospital (such as hives or swelling),  call your hospital at the correct number below. Or call your family doctor.  If you have breathing distress or wheezing, call 911.    Special instructions: ***    I have read and understand the above information.    Signature:______________________________________ Date:___________    Staff:__________________________________________ Date:___________     Time:__________    [place] [facility] Departments:    ___[place]: [phone]  ___[facility]: [phone]  ___[place]: [phone] ___[facility]: [phone]  ___[facility]: [phone]  ___[facility]: [phone]  ___[phone]  ___[facility]: [phone]  ___[phone]

## 2018-04-04 NOTE — PROGRESS NOTES
Chief Concern/Diagnosis: Left third rib chondrosarcoma    Prior Surgeries:    . 2016 resection of left third rib and left T3 pedicle with fusion of adjacent vertebrae    Subjective:  Doing very well.  Has returned to full activity and work, including shoveling snow.  No concerns.  Happy with how things are going.      Physical Examination:    Gen:  Alert, no acute distress, well nourished, appears stated age    Psych:  Normal affect, nonpressured speech    Musculoskeletal:  Examination of the surgical site shows a well-healed incision.  No wound complications present.      Imaging:  Independent review of 2018 CT and MRI chest shows no concerns for metastatic disease to the lungs.  No evidence of recurrence of a chondrosarcoma locally.      Assessment:   63 year old male with the followin. 18 months status post left third rib chondrosarcoma resection.  No evidence of recurrence.      Recommendations:  1. Continue with every 3 months CT and MRI scan until 2 year christy.      Seen with Dr. Walker, documentation by:    Syed Shook MD  Orthopaedic Surgery PGY-4      Answers for HPI/ROS submitted by the patient on 4/3/2018   General Symptoms: No  Skin Symptoms: No  HENT Symptoms: No  EYE SYMPTOMS: No  HEART SYMPTOMS: No  LUNG SYMPTOMS: No  INTESTINAL SYMPTOMS: No  URINARY SYMPTOMS: No  REPRODUCTIVE SYMPTOMS: No  SKELETAL SYMPTOMS: No  BLOOD SYMPTOMS: No  NERVOUS SYSTEM SYMPTOMS: No  MENTAL HEALTH SYMPTOMS: No

## 2018-04-04 NOTE — NURSING NOTE
"Reason For Visit:   Chief Complaint   Patient presents with     RECHECK     F/U Chondrosarcoma. Same Day MRI and CT Scan.       Pain Assessment  Patient Currently in Pain: No               HEIGHT: 5' 11.5\", WEIGHT: 239 lbs 4.8 oz, BMI: Body mass index is 32.91 kg/(m^2).      No current outpatient prescriptions on file.        No Known Allergies              "

## 2018-05-24 DIAGNOSIS — C49.9 SARCOMA (H): Primary | ICD-10-CM

## 2018-08-01 ENCOUNTER — RADIANT APPOINTMENT (OUTPATIENT)
Dept: CT IMAGING | Facility: CLINIC | Age: 63
End: 2018-08-01
Attending: ORTHOPAEDIC SURGERY
Payer: COMMERCIAL

## 2018-08-01 ENCOUNTER — OFFICE VISIT (OUTPATIENT)
Dept: ORTHOPEDICS | Facility: CLINIC | Age: 63
End: 2018-08-01
Payer: COMMERCIAL

## 2018-08-01 ENCOUNTER — RADIANT APPOINTMENT (OUTPATIENT)
Dept: MRI IMAGING | Facility: CLINIC | Age: 63
End: 2018-08-01
Attending: ORTHOPAEDIC SURGERY
Payer: COMMERCIAL

## 2018-08-01 DIAGNOSIS — C49.9 SARCOMA (H): ICD-10-CM

## 2018-08-01 DIAGNOSIS — C41.3 CHONDROSARCOMA OF RIBS (H): Primary | ICD-10-CM

## 2018-08-01 RX ORDER — GADOBUTROL 604.72 MG/ML
10 INJECTION INTRAVENOUS ONCE
Status: COMPLETED | OUTPATIENT
Start: 2018-08-01 | End: 2018-08-01

## 2018-08-01 RX ADMIN — GADOBUTROL 10 ML: 604.72 INJECTION INTRAVENOUS at 06:59

## 2018-08-01 NOTE — MR AVS SNAPSHOT
After Visit Summary   8/1/2018    Felice Boyle    MRN: [id number]           Patient Information     Date Of Birth          1955        Visit Information        Provider Department      8/1/2018 7:45 AM Jeremy Walker MD [facility]        Today's Diagnoses     Chondrosarcoma of ribs (H)    -  1       Follow-ups after your visit        Who to contact     Please call your clinic at [phone] to:    Ask questions about your health    Make or cancel appointments    Discuss your medicines    Learn about your test results    Speak to your doctor            Additional Information About Your Visit        MyChart Information     [employer] gives you secure access to your electronic health record. If you see a primary care provider, you can also send messages to your care team and make appointments. If you have questions, please call your primary care clinic.  If you do not have a primary care provider, please call [phone] and they will assist you.      [employer] is an electronic gateway that provides easy, online access to your medical records. With [employer], you can request a clinic appointment, read your test results, renew a prescription or communicate with your care team.     To access your existing account, please contact your [facility] or call [phone] for assistance.        Care EveryWhere ID     This is your Care EveryWhere ID. This could be used by other organizations to access your [place] medical records  [id number]         Blood Pressure from Last 3 Encounters:   02/17/17 (!) 148/95   11/16/16 134/87   10/14/16 135/76    Weight from Last 3 Encounters:   04/04/18 108.5 kg (239 lb 4.8 oz)   01/24/18 108.9 kg (240 lb)   09/22/17 101.7 kg (224 lb 1.6 oz)              Today, you had the following     No orders found for display       Primary Care Provider Office Phone # Fax #    Saul Hahn [phone]        [facility] [address] [facility] [address] [place] [address]        Equal Access to Services     DEJUAN CASTANO : Hadii jennifer schilling hadjalilo Sogabrielaali, walinusda luqadaha, qapatriciota kaalmada sharondemarco, waxayden sergein hayaakarina harpergriselda barrera marlyn fraga. So [facility] [phone].    ATENCIÓN: Si habla español, tiene a jimenez disposición servicios gratuitos de asistencia lingüística. Llame al [phone].    We comply with applicable federal civil rights laws and [place] laws. We do not discriminate on the basis of race, color, national origin, age, disability, sex, sexual orientation, or gender identity.            Thank you!     Thank you for choosing [facility]  for your care. Our goal is always to provide you with excellent care. Hearing back from our patients is one way we can continue to improve our services. Please take a few minutes to complete the written survey that you may receive in the mail after your visit with us. Thank you!             Your Updated Medication List - Protect others around you: Learn how to safely use, store and throw away your medicines at www.disposemymeds.org.      Notice  As of 8/1/2018  9:37 AM    You have not been prescribed any medications.

## 2018-08-01 NOTE — LETTER
8/1/2018       RE: Felice Boyle  [address] [facility] [facility] [address]     Dear Colleague,    Thank you for referring your patient, Felice Boyle, to the [facility] at [facility]. Please see a copy of my visit note below.    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  He has no complaints and has been able to work without restrictions.  He is here with his wife.  There have been no changes in his medical family and social history.  I reviewed his patient surveys in the EMR.    On examination he is alert oriented has a normal mood and affect and is in no acute distress.  Respirations are regular and unlabored eyes are nonicteric with equal pupils.  He has a normal gait.  He has no restriction in his upper extremity function with no edema or erythema in his arms.      I reviewed his MRI and CAT scan of his chest.  To my eye there is no sign of recurrence of the tumor or metastatic disease in the lungs.    This patient will return to see me in about 3 months with a repeat MRI of the thoracic spine area and CT scan of the chest.  He has no activity restrictions.       Again, thank you for allowing me to participate in the care of your patient.      Sincerely,    Jeremy Walker MD

## 2018-08-01 NOTE — PROGRESS NOTES
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  He has no complaints and has been able to work without restrictions.  He is here with his wife.  There have been no changes in his medical family and social history.  I reviewed his patient surveys in the EMR.    On examination he is alert oriented has a normal mood and affect and is in no acute distress.  Respirations are regular and unlabored eyes are nonicteric with equal pupils.  He has a normal gait.  He has no restriction in his upper extremity function with no edema or erythema in his arms.      I reviewed his MRI and CAT scan of his chest.  To my eye there is no sign of recurrence of the tumor or metastatic disease in the lungs.    This patient will return to see me in about 3 months with a repeat MRI of the thoracic spine area and CT scan of the chest.  He has no activity restrictions.

## 2018-08-01 NOTE — DISCHARGE INSTRUCTIONS
MRI Contrast Discharge Instructions    The IV contrast you received today will pass out of your body in your  urine. This will happen in the next 24 hours. You will not feel this process.  Your urine will not change color.    Drink at least 4 extra glasses of water or juice today (unless your doctor  has restricted your fluids). This reduces the stress on your kidneys.  You may take your regular medicines.    If you are on dialysis: It is best to have dialysis today.    If you have a reaction: Most reactions happen right away. If you have  any new symptoms after leaving the hospital (such as hives or swelling),  call your hospital at the correct number below. Or call your family doctor.  If you have breathing distress or wheezing, call 911.    Special instructions: ***    I have read and understand the above information.    Signature:______________________________________ Date:___________    Staff:__________________________________________ Date:___________     Time:__________    [place] Radiology Departments:    ___[place]: [phone]  ___[facility]: [phone]  ___[place]: [phone] ___[facility]: [phone]  ___[facility]: [phone]  ___[facility]: [phone]  ___[phone]  ___[facility]: [phone]  ___[phone]

## 2018-09-10 DIAGNOSIS — C49.9 SARCOMA (H): Primary | ICD-10-CM

## 2018-09-12 ENCOUNTER — TELEPHONE (OUTPATIENT)
Dept: ORTHOPEDICS | Facility: CLINIC | Age: 63
End: 2018-09-12

## 2018-09-12 NOTE — TELEPHONE ENCOUNTER
[facility] Call Center    Phone Message    May a detailed message be left on voicemail: yes    Reason for Call: Other: Pt calling to make sure fax he sent regarding a letter he needed was received. Please call pt to confirm.     Action Taken: Message routed to:  [facility] Orthopedics

## 2018-10-05 ENCOUNTER — RADIANT APPOINTMENT (OUTPATIENT)
Dept: CT IMAGING | Facility: CLINIC | Age: 63
End: 2018-10-05
Attending: ORTHOPAEDIC SURGERY
Payer: COMMERCIAL

## 2018-10-05 ENCOUNTER — OFFICE VISIT (OUTPATIENT)
Dept: ORTHOPEDICS | Facility: CLINIC | Age: 63
End: 2018-10-05
Payer: COMMERCIAL

## 2018-10-05 ENCOUNTER — RADIANT APPOINTMENT (OUTPATIENT)
Dept: MRI IMAGING | Facility: CLINIC | Age: 63
End: 2018-10-05
Attending: ORTHOPAEDIC SURGERY
Payer: COMMERCIAL

## 2018-10-05 VITALS — HEIGHT: 71 IN | BODY MASS INDEX: 34.86 KG/M2 | WEIGHT: 249 LBS

## 2018-10-05 DIAGNOSIS — C49.9 SARCOMA (H): ICD-10-CM

## 2018-10-05 DIAGNOSIS — C41.3 CHONDROSARCOMA OF RIBS (H): Primary | ICD-10-CM

## 2018-10-05 DIAGNOSIS — C49.9 SARCOMA (H): Primary | ICD-10-CM

## 2018-10-05 LAB — RADIOLOGIST FLAGS: NORMAL

## 2018-10-05 RX ORDER — GADOBUTROL 604.72 MG/ML
10 INJECTION INTRAVENOUS ONCE
Status: COMPLETED | OUTPATIENT
Start: 2018-10-05 | End: 2018-10-05

## 2018-10-05 RX ADMIN — GADOBUTROL 10 ML: 604.72 INJECTION INTRAVENOUS at 07:41

## 2018-10-05 NOTE — LETTER
10/5/2018       RE: Felice Boyle  [address] [address] [facility] [address]     Dear Colleague,    Thank you for referring your patient, Felice Boyle, to the [facility] at [facility]. Please see a copy of my visit note below.    This 63-year-old man is about 2 years out from removal of a chondrosarcoma from his spine.  He has no complaints today and has returned to work.  He has been feeling a cold coming on but has not experienced fevers chills night sweats or nausea.  I reviewed his patient survey information in the EMR.    On examination he is alert oriented has normal mood and affect and is in no acute distress.  Respirations are regular and unlabored eyes are nonicteric with equal pupils.  He has a normal gait.  His upper extremities lack any edema or erythema.  His range of motion is unrestricted and his shoulders elbows wrists and hands.    I reviewed his MRI and CT scan.  There is no sign of local recurrence to my eye.  On his CT scan there is an area of inflammation in the left lung that most consistent with a small infection.    I recommended that we recheck the lung scan in about 3 months.  He will after that the restaged every 6 months.  He will report to his primary doctor if he notices symptoms of pneumonia such as cough fever shortness of breath.    Again, thank you for allowing me to participate in the care of your patient.      Sincerely,    Jeremy Walker MD

## 2018-10-05 NOTE — NURSING NOTE
"Reason For Visit:   Chief Complaint   Patient presents with     Spine - RECHECK       Ht 1.81 m (5' 11.25\")  Wt 112.9 kg (249 lb)  BMI 34.49 kg/m2    Pain Assessment  Patient Currently in Pain: Giacomo Gallagher, ATC  "

## 2018-10-05 NOTE — PROGRESS NOTES
This 63-year-old man is about 2 years out from removal of a chondrosarcoma from his spine.  He has no complaints today and has returned to work.  He has been feeling a cold coming on but has not experienced fevers chills night sweats or nausea.  I reviewed his patient survey information in the EMR.    On examination he is alert oriented has normal mood and affect and is in no acute distress.  Respirations are regular and unlabored eyes are nonicteric with equal pupils.  He has a normal gait.  His upper extremities lack any edema or erythema.  His range of motion is unrestricted and his shoulders elbows wrists and hands.    I reviewed his MRI and CT scan.  There is no sign of local recurrence to my eye.  On his CT scan there is an area of inflammation in the left lung that most consistent with a small infection.    I recommended that we recheck the lung scan in about 3 months.  He will after that the restaged every 6 months.  He will report to his primary doctor if he notices symptoms of pneumonia such as cough fever shortness of breath.

## 2018-10-05 NOTE — DISCHARGE INSTRUCTIONS
MRI Contrast Discharge Instructions    The IV contrast you received today will pass out of your body in your  urine. This will happen in the next 24 hours. You will not feel this process.  Your urine will not change color.    Drink at least 4 extra glasses of water or juice today (unless your doctor  has restricted your fluids). This reduces the stress on your kidneys.  You may take your regular medicines.    If you are on dialysis: It is best to have dialysis today.    If you have a reaction: Most reactions happen right away. If you have  any new symptoms after leaving the hospital (such as hives or swelling),  call your hospital at the correct number below. Or call your family doctor.  If you have breathing distress or wheezing, call 911.    Special instructions: ***    I have read and understand the above information.    Signature:______________________________________ Date:___________    Staff:__________________________________________ Date:___________     Time:__________    [place] Radiology Departments:    ___[place]: [phone]  ___[facility]: [phone]  ___[place]: [phone] ___[facility]: [phone]  ___[facility]: [phone]  ___[facility]: [phone]  ___[phone]  ___[facility]: [phone]  ___[phone]

## 2018-10-05 NOTE — MR AVS SNAPSHOT
"              After Visit Summary   10/5/2018    Felice Boyle    MRN: [id number]           Patient Information     Date Of Birth          1955        Visit Information        Provider Department      10/5/2018 9:15 AM Jeremy Walker MD [facility]        Today's Diagnoses     Chondrosarcoma of ribs (H)    -  1       Follow-ups after your visit        Who to contact     Please call your clinic at [phone] to:    Ask questions about your health    Make or cancel appointments    Discuss your medicines    Learn about your test results    Speak to your doctor            Additional Information About Your Visit        MyChart Information     [employer] gives you secure access to your electronic health record. If you see a primary care provider, you can also send messages to your care team and make appointments. If you have questions, please call your primary care clinic.  If you do not have a primary care provider, please call [phone] and they will assist you.      [employer] is an electronic gateway that provides easy, online access to your medical records. With [employer], you can request a clinic appointment, read your test results, renew a prescription or communicate with your care team.     To access your existing account, please contact your [facility] or call [phone] for assistance.        Care EveryWhere ID     This is your Care EveryWhere ID. This could be used by other organizations to access your [place] medical records  [id number]        Your Vitals Were     Height BMI (Body Mass Index)                1.81 m (5' 11.25\") 34.49 kg/m2           Blood Pressure from Last 3 Encounters:   02/17/17 (!) 148/95   11/16/16 134/87   10/14/16 135/76    Weight from Last 3 Encounters:   10/05/18 112.9 kg (249 lb)   04/04/18 108.5 kg (239 lb 4.8 oz)   01/24/18 108.9 kg (240 lb)              Today, you had the following     No orders found for display       " Primary Care Provider Office Phone # Fax #    Saul Hahn [phone]       [facility] [address] [facility] [facility] [address]        Equal Access to Services     DEJUAN CASTANO : Hadii jennifer ku hadjalilo Soomaali, waaxda luqadaha, qaybta kaalmada adeegyada, waxayden idiin shengn sharon bruce marlyn fraga. So [facility] [phone].    ATENCIÓN: Si habla español, tiene a jimenez disposición servicios gratuitos de asistencia lingüística. Llame al [phone].    We comply with applicable federal civil rights laws and [place] laws. We do not discriminate on the basis of race, color, national origin, age, disability, sex, sexual orientation, or gender identity.            Thank you!     Thank you for choosing [facility]  for your care. Our goal is always to provide you with excellent care. Hearing back from our patients is one way we can continue to improve our services. Please take a few minutes to complete the written survey that you may receive in the mail after your visit with us. Thank you!             Your Updated Medication List - Protect others around you: Learn how to safely use, store and throw away your medicines at www.disposemymeds.org.      Notice  As of 10/5/2018 11:21 AM    You have not been prescribed any medications.

## 2018-12-05 ENCOUNTER — OFFICE VISIT (OUTPATIENT)
Dept: ORTHOPEDICS | Facility: CLINIC | Age: 63
End: 2018-12-05
Payer: COMMERCIAL

## 2018-12-05 ENCOUNTER — RADIANT APPOINTMENT (OUTPATIENT)
Dept: CT IMAGING | Facility: CLINIC | Age: 63
End: 2018-12-05
Attending: ORTHOPAEDIC SURGERY
Payer: COMMERCIAL

## 2018-12-05 DIAGNOSIS — C41.3 CHONDROSARCOMA OF RIBS (H): Primary | ICD-10-CM

## 2018-12-05 DIAGNOSIS — C49.9 SARCOMA (H): ICD-10-CM

## 2018-12-05 NOTE — PROGRESS NOTES
This 63-year-old man is returning to recheck his CT scan.  Her last check 2 months ago showed a lesion in the left lung which we thought was likely inflammatory or infectious in nature.  Today he has no symptoms and feels completely well.    He is able to ambulate without difficulty.  He is in no acute distress respirations are regular and unlabored and eyes are nonicteric with equal pupils.    I reviewed his CT scan and this lesion in the left lung has resolved completely.      We will resume our schedule of restaging for the third year by checking him again in March.  He has no activity restrictions.  I have answered all his questions.

## 2018-12-05 NOTE — MR AVS SNAPSHOT
After Visit Summary   12/5/2018    Felice Boyle    MRN: [id number]           Patient Information     Date Of Birth          1955        Visit Information        Provider Department      12/5/2018 7:30 AM Jeremy Walker MD [facility]        Today's Diagnoses     Chondrosarcoma of ribs (H)    -  1       Follow-ups after your visit        Who to contact     Please call your clinic at [phone] to:    Ask questions about your health    Make or cancel appointments    Discuss your medicines    Learn about your test results    Speak to your doctor            Additional Information About Your Visit        MyChart Information     [employer] gives you secure access to your electronic health record. If you see a primary care provider, you can also send messages to your care team and make appointments. If you have questions, please call your primary care clinic.  If you do not have a primary care provider, please call [phone] and they will assist you.      [employer] is an electronic gateway that provides easy, online access to your medical records. With [employer], you can request a clinic appointment, read your test results, renew a prescription or communicate with your care team.     To access your existing account, please contact your [facility] or call [phone] for assistance.        Care EveryWhere ID     This is your Care EveryWhere ID. This could be used by other organizations to access your [place] medical records  [id number]         Blood Pressure from Last 3 Encounters:   02/17/17 (!) 148/95   11/16/16 134/87   10/14/16 135/76    Weight from Last 3 Encounters:   10/05/18 112.9 kg (249 lb)   04/04/18 108.5 kg (239 lb 4.8 oz)   01/24/18 108.9 kg (240 lb)              Today, you had the following     No orders found for display       Primary Care Provider Office Phone # Fax #    Saul Hahn [phone]       [facility]  [address] [place] [address]        Equal Access to Services     DEJUAN CASTANO : Hadii jennifer schilling hadjalilo Sogabrielaali, waaxda luqadaha, qaybta kaalmada sharondemarco, dillon sergein hayaakarina harpergriselda barrera marlyn fraga. So [facility] [phone].    ATENCIÓN: Si habla español, tiene a jimenez disposición servicios gratuitos de asistencia lingüística. Llame al [phone].    We comply with applicable federal civil rights laws and [place] laws. We do not discriminate on the basis of race, color, national origin, age, disability, sex, sexual orientation, or gender identity.            Thank you!     Thank you for choosing [facility]  for your care. Our goal is always to provide you with excellent care. Hearing back from our patients is one way we can continue to improve our services. Please take a few minutes to complete the written survey that you may receive in the mail after your visit with us. Thank you!             Your Updated Medication List - Protect others around you: Learn how to safely use, store and throw away your medicines at www.disposemymeds.org.      Notice  As of 12/5/2018 10:50 AM    You have not been prescribed any medications.

## 2018-12-05 NOTE — LETTER
12/5/2018       RE: Felice Boyle  [address] [address] [facility] [address]     Dear Colleague,    Thank you for referring your patient, Felice Boyle, to the [facility] at [facility]. Please see a copy of my visit note below.    This 63-year-old man is returning to recheck his CT scan.  Her last check 2 months ago showed a lesion in the left lung which we thought was likely inflammatory or infectious in nature.  Today he has no symptoms and feels completely well.    He is able to ambulate without difficulty.  He is in no acute distress respirations are regular and unlabored and eyes are nonicteric with equal pupils.    I reviewed his CT scan and this lesion in the left lung has resolved completely.      We will resume our schedule of restaging for the third year by checking him again in March.  He has no activity restrictions.  I have answered all his questions.    Again, thank you for allowing me to participate in the care of your patient.      Sincerely,    Jeremy Walker MD

## 2019-02-08 DIAGNOSIS — C41.9 CHONDROSARCOMA (H): ICD-10-CM

## 2019-02-08 DIAGNOSIS — C41.3 CHONDROSARCOMA OF RIBS (H): Primary | ICD-10-CM

## 2019-02-13 ENCOUNTER — DOCUMENTATION ONLY (OUTPATIENT)
Dept: CARE COORDINATION | Facility: CLINIC | Age: 64
End: 2019-02-13

## 2019-03-06 ENCOUNTER — ANCILLARY PROCEDURE (OUTPATIENT)
Dept: CT IMAGING | Facility: CLINIC | Age: 64
End: 2019-03-06
Attending: ORTHOPAEDIC SURGERY
Payer: COMMERCIAL

## 2019-03-06 ENCOUNTER — OFFICE VISIT (OUTPATIENT)
Dept: ORTHOPEDICS | Facility: CLINIC | Age: 64
End: 2019-03-06
Payer: COMMERCIAL

## 2019-03-06 ENCOUNTER — ANCILLARY PROCEDURE (OUTPATIENT)
Dept: MRI IMAGING | Facility: CLINIC | Age: 64
End: 2019-03-06
Attending: ORTHOPAEDIC SURGERY
Payer: COMMERCIAL

## 2019-03-06 VITALS — WEIGHT: 253 LBS | BODY MASS INDEX: 35.42 KG/M2 | HEIGHT: 71 IN

## 2019-03-06 DIAGNOSIS — C41.3 CHONDROSARCOMA OF RIBS (H): Primary | ICD-10-CM

## 2019-03-06 DIAGNOSIS — C41.9 CHONDROSARCOMA (H): ICD-10-CM

## 2019-03-06 ASSESSMENT — MIFFLIN-ST. JEOR: SCORE: 1963.69

## 2019-03-06 NOTE — NURSING NOTE
"Reason For Visit:   Chief Complaint   Patient presents with     Spine - RECHECK       Ht 1.81 m (5' 11.25\")   Wt 114.8 kg (253 lb)   BMI 35.04 kg/m      Pain Assessment  Patient Currently in Pain: Giacomo Gallagher, ATC  "

## 2019-03-06 NOTE — PROGRESS NOTES
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  He has no complaints today.    On examination he is alert oriented has a normal mood and affect and is in no acute distress.  He is able to ambulate without a limp.  He has a well-healed scar.    I reviewed his CT scan and the report as well as the MRI.  There is no sign of metastatic disease and no sign to my eye of local recurrence.      He will return in 6 months with a repeat MRI of the upper thoracic spine and a CT scan of the chest.

## 2019-03-06 NOTE — LETTER
3/6/2019       RE: Felice Boyle  [address] [address] [facility] [address]     Dear Colleague,    Thank you for referring your patient, Felice Boyle, to the [facility] at [facility]. Please see a copy of my visit note below.    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  He has no complaints today.    On examination he is alert oriented has a normal mood and affect and is in no acute distress.  He is able to ambulate without a limp.  He has a well-healed scar.    I reviewed his CT scan and the report as well as the MRI.  There is no sign of metastatic disease and no sign to my eye of local recurrence.      He will return in 6 months with a repeat MRI of the upper thoracic spine and a CT scan of the chest.    Again, thank you for allowing me to participate in the care of your patient.      Sincerely,    Jeremy Walker MD

## 2019-06-05 DIAGNOSIS — C41.9 CHONDROSARCOMA (H): Primary | ICD-10-CM

## 2019-09-04 ENCOUNTER — OFFICE VISIT (OUTPATIENT)
Dept: ORTHOPEDICS | Facility: CLINIC | Age: 64
End: 2019-09-04
Payer: COMMERCIAL

## 2019-09-04 ENCOUNTER — ANCILLARY PROCEDURE (OUTPATIENT)
Dept: MRI IMAGING | Facility: CLINIC | Age: 64
End: 2019-09-04
Attending: ORTHOPAEDIC SURGERY
Payer: COMMERCIAL

## 2019-09-04 ENCOUNTER — ANCILLARY PROCEDURE (OUTPATIENT)
Dept: CT IMAGING | Facility: CLINIC | Age: 64
End: 2019-09-04
Attending: ORTHOPAEDIC SURGERY
Payer: COMMERCIAL

## 2019-09-04 VITALS — WEIGHT: 256 LBS | BODY MASS INDEX: 34.67 KG/M2 | HEIGHT: 72 IN

## 2019-09-04 DIAGNOSIS — C41.9 CHONDROSARCOMA (H): ICD-10-CM

## 2019-09-04 DIAGNOSIS — C41.9 CHONDROSARCOMA (H): Primary | ICD-10-CM

## 2019-09-04 RX ORDER — GADOBUTROL 604.72 MG/ML
15 INJECTION INTRAVENOUS ONCE
Status: COMPLETED | OUTPATIENT
Start: 2019-09-04 | End: 2019-09-04

## 2019-09-04 RX ADMIN — GADOBUTROL 11.5 ML: 604.72 INJECTION INTRAVENOUS at 07:56

## 2019-09-04 ASSESSMENT — PATIENT HEALTH QUESTIONNAIRE - PHQ9
SUM OF ALL RESPONSES TO PHQ QUESTIONS 1-9: 0
SUM OF ALL RESPONSES TO PHQ QUESTIONS 1-9: 0

## 2019-09-04 ASSESSMENT — MIFFLIN-ST. JEOR: SCORE: 1983.72

## 2019-09-04 NOTE — NURSING NOTE
"Reason For Visit:   Chief Complaint   Patient presents with     RECHECK     followup left third rib chondrosarcoma        Ht 1.82 m (5' 11.65\")   Wt 116.1 kg (256 lb)   BMI 35.06 kg/m      Pain Assessment  Patient Currently in Pain: [place]    Stephanie Sylvester ATC    "

## 2019-09-04 NOTE — LETTER
9/4/2019       RE: Felice Boyle  [address]  [facility] [address]     Dear Colleague,    Thank you for referring your patient, Felice Boyle, to the [facility] at [facility]. Please see a copy of my visit note below.      Orthopaedic Outpatient Clinic Follow Up Note     Date of Service: 09/04/2019    Surgery: Wide resection of left 3rd rib and 2nd/3rd thoracic vertebra for chondrosarcoma on 10/10/2016 with Dr. Walker    S: Felice Boyle is a pleasant 64 year old male who presents to clinic today in follow up 3 years postoperative from the above surgery. His last visit was on 8/1/2018 and he has been doing well since that time. There has been no new symptoms or changes in his health. No fevers, chills, weight loss, chest pain, SOB, numbness, tingling, weakness. 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 They have no questions or concerns today.    ROS:   Const: (negative, no fevers/chills or weight loss) ENT: (negative) Pulm: (negative, no shortness of breath) Cardio: (negative, no chest pain) GI: (negative) : (negative) Heme: (negative) MSK: (negative except as stated in HPI) Skin: (negative) Neuro: (negative except as stated in HPI)    O:  Physical Exam:   Gen: NAD  HEENT: Normal appearance  CV: RRR  Pulm: NLB on RA  Back: Thoracic incision well healed  BUE:  AROM of shoulder, elbow, and wrist without pain. SILT A/M/R/U. Fires all muscle groups distally. Hands and fingers WWP.     Imaging:   Thoracic spine MRI 9/4/2019 demonstrate spinal hardware artifact without obvious aberrant mass. Pending formal Radiology read.  Chest CT 9/4/201 demonstrate no new lung nodules. Pending formal Radiology read.     A/P: Felice Boyle is a 64 year old male approximately 3 years s/p the above surgery with Dr. Walker. He is doing well and has a favorable prognosis.   -Follow up in 1 year with repeat chest CT and thoracic  spine MRI w/o contrast - current plan for a total of 5 years of postoperative follow up  -Patent to contact sooner with any questions or concerns    This patient was seen by and discussed with Dr. Walker who is in agreement with the above.    Darrian Garcia MD, PhD 09/04/2019  Orthopaedic Surgery Resident, PGY-4  Pager: [phone]    I was present with the resident during the history and exam.  I discussed the case with the resident and agree with the findings as documented in the assessment and plan.    Again, thank you for allowing me to participate in the care of your patient.      Sincerely,    Jeremy Walker MD

## 2019-09-04 NOTE — LETTER
[facility]  [address] [facility] [address] [phone]        September 4, 2019    Regarding:  Felice Boyle  [address]  [facility] [address]              To Whom It May Concern;    The above named patient is under my care for a history of chondrosarcoma.  He is 3 years out from his surgery.  He has never shown any sign of metastatic disease and as of today's date has no evidence of suspicious lung nodules and no evidence of recurrence of his chondrosarcoma.  He is currently asymptomatic from his previous surgery.  His exam shows no deficit in his spine or upper extremity function.  He is not receiving any over-the-counter or prescription medications from my office.  The current treatment plan is observation.  His next scheduled scan of his lungs and his spine will be back in 12 months.  His prognosis is excellent.     This patient also has a history of lung nodules.  There are several nodules of 2 mm that were detected at our initial screening of the patient's lungs more than 3 years ago.  These have not increased in size or number over the last 3 years during our monitoring.  The nodules are all stable. Their size is the smallest detectable by typical CT scan.  Nodules of this size are extremely common among patients of all ages and more common among patients as they get older.  The nodules do not represent a concern for active infection or tumor.  They most likely represent areas of inflammation from events in the distant past.  The patient's exam is normal.  The treatment plan is simply observation.  There are no medications prescribed for these.  The prognosis is excellent.  These will be rechecked with a CT scan of the chest in 12 months.    If there are any other questions please do not hesitate to contact us.    Sincerely,        Jeremy Walker MD

## 2019-09-04 NOTE — PROGRESS NOTES
Orthopaedic Outpatient Clinic Follow Up Note     Date of Service: 09/04/2019    Surgery: Wide resection of left 3rd rib and 2nd/3rd thoracic vertebra for chondrosarcoma on 10/10/2016 with Dr. Walker    S: Felice Boyle is a pleasant 64 year old male who presents to clinic today in follow up 3 years postoperative from the above surgery. His last visit was on 8/1/2018 and he has been doing well since that time. There has been no new symptoms or changes in his health. No fevers, chills, weight loss, chest pain, SOB, numbness, tingling, weakness. 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 They have no questions or concerns today.    ROS:   Const: (negative, no fevers/chills or weight loss) ENT: (negative) Pulm: (negative, no shortness of breath) Cardio: (negative, no chest pain) GI: (negative) : (negative) Heme: (negative) MSK: (negative except as stated in HPI) Skin: (negative) Neuro: (negative except as stated in HPI)    O:  Physical Exam:   Gen: NAD  HEENT: Normal appearance  CV: RRR  Pulm: NLB on RA  Back: Thoracic incision well healed  BUE:  AROM of shoulder, elbow, and wrist without pain. SILT A/M/R/U. Fires all muscle groups distally. Hands and fingers WWP.     Imaging:   Thoracic spine MRI 9/4/2019 demonstrate spinal hardware artifact without obvious aberrant mass. Pending formal Radiology read.  Chest CT 9/4/201 demonstrate no new lung nodules. Pending formal Radiology read.     A/P: Felice Boyle is a 64 year old male approximately 3 years s/p the above surgery with Dr. Walker. He is doing well and has a favorable prognosis.   -Follow up in 1 year with repeat chest CT and thoracic spine MRI w/o contrast - current plan for a total of 5 years of postoperative follow up  -Patent to contact sooner with any questions or concerns    This patient was seen by and discussed with Dr. Walker who is in agreement with the above.    Darrian Garcia MD, PhD  09/04/2019  Orthopaedic Surgery Resident, PGY-4  Pager: [phone]

## 2019-09-05 LAB — RADIOLOGIST FLAGS: NORMAL

## 2019-09-05 ASSESSMENT — PATIENT HEALTH QUESTIONNAIRE - PHQ9: SUM OF ALL RESPONSES TO PHQ QUESTIONS 1-9: 0

## 2019-09-06 ENCOUNTER — TELEPHONE (OUTPATIENT)
Dept: ORTHOPEDICS | Facility: CLINIC | Age: 64
End: 2019-09-06

## 2019-09-06 NOTE — TELEPHONE ENCOUNTER
----- Message from Jeremy Walker MD sent at 9/6/2019  4:38 PM CDT -----  Yes with just the CT chest.  ----- Message -----  From: Raysa Holcomb RN  Sent: 9/6/2019   1:07 PM  To: Jeremy Walker MD    3 month followup?   ----- Message -----  From: Maru Proctor ATC  Sent: 9/5/2019  12:10 PM  To: Jeremy Walker MD, #    CT scan incidental finding:   New peribronchial groundglass and solid nodules in the posterior  right upper lobe, distribution favors infectious etiology. However,  given history of malignancy. Recommend short-term follow-up to ensure resolution.

## 2019-09-06 NOTE — TELEPHONE ENCOUNTER
Rn called and left a voice message with Moe.  Just wondering if he has had a cold or URI or breathing issues lately or currently.  His CT report indicated some finding, see report findings below.  We would like to repeat CT Chest in 3 months to double check this. Please call me  and we can discuss this further

## 2019-09-11 DIAGNOSIS — C41.9 CHONDROSARCOMA (H): Primary | ICD-10-CM

## 2019-10-02 ENCOUNTER — HEALTH MAINTENANCE LETTER (OUTPATIENT)
Age: 64
End: 2019-10-02

## 2019-12-04 ENCOUNTER — ANCILLARY PROCEDURE (OUTPATIENT)
Dept: CT IMAGING | Facility: CLINIC | Age: 64
End: 2019-12-04
Attending: ORTHOPAEDIC SURGERY
Payer: COMMERCIAL

## 2019-12-04 ENCOUNTER — OFFICE VISIT (OUTPATIENT)
Dept: ORTHOPEDICS | Facility: CLINIC | Age: 64
End: 2019-12-04
Payer: COMMERCIAL

## 2019-12-04 DIAGNOSIS — C41.9 CHONDROSARCOMA (H): ICD-10-CM

## 2019-12-04 DIAGNOSIS — C41.9 CHONDROSARCOMA (H): Primary | ICD-10-CM

## 2019-12-04 NOTE — PROGRESS NOTES
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  He presents now for a repeat chest CT scan to follow-up on groundglass opacities in his right lung he has not had any upper respiratory infection or lung symptoms.    On examination he is alert oriented has a normal mood and stress.  Respirations are regular and unlabored eyes are nonicteric.  He has a normal gait.    His new lung scan shows that the old areas of groundglass opacity have resolved however he has some new areas in that same region at different level.  These seem to be also small and of the same magnitude.  I have been in favor of continued observation but we will also await the report from the radiologist to get any of the recommendations.  The patient is comfortable with this course of action and we will plan for a CT scan in 3 months.

## 2019-12-04 NOTE — LETTER
12/4/2019       RE: Felice Boyle  [address] [facility] [address]     Dear Colleague,    Thank you for referring your patient, Felice Boyle, to the [facility] at [facility]. Please see a copy of my visit note below.    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  He presents now for a repeat chest CT scan to follow-up on groundglass opacities in his right lung he has not had any upper respiratory infection or lung symptoms.    On examination he is alert oriented has a normal mood and stress.  Respirations are regular and unlabored eyes are nonicteric.  He has a normal gait.    His new lung scan shows that the old areas of groundglass opacity have resolved however he has some new areas in that same region at different level.  These seem to be also small and of the same magnitude.  I have been in favor of continued observation but we will also await the report from the radiologist to get any of the recommendations.  The patient is comfortable with this course of action and we will plan for a CT scan in 3 months.    Again, thank you for allowing me to participate in the care of your patient.      Sincerely,    Jeremy Walker MD

## 2019-12-04 NOTE — NURSING NOTE
Reason For Visit:   Chief Complaint   Patient presents with     RECHECK     followup chondrosarcona and review CT       There were no vitals taken for this visit.    Pain Assessment  Patient Currently in Pain: Giacomo Sylvester, ATC

## 2019-12-06 ENCOUNTER — TELEPHONE (OUTPATIENT)
Dept: ORTHOPEDICS | Facility: CLINIC | Age: 64
End: 2019-12-06

## 2019-12-06 NOTE — TELEPHONE ENCOUNTER
----- Message from Jeremy Walker MD sent at 12/4/2019  3:34 PM CST -----  That is what I thought they would say.  Not sure if he wants to get into this with his primary doctor but we would scan him again in 3 months.  ----- Message -----  From: Raysa Holcomb RN  Sent: 12/4/2019   2:45 PM CST  To: Jeremy Walker MD    See final read on CT Chest...    Thanks  Raysa

## 2019-12-09 ENCOUNTER — TELEPHONE (OUTPATIENT)
Dept: ORTHOPEDICS | Facility: CLINIC | Age: 64
End: 2019-12-09

## 2019-12-09 NOTE — TELEPHONE ENCOUNTER
RN called and left voice message with Moe.  Will try back again or call me.  Regarding CT Chest final read suggestions

## 2019-12-13 ENCOUNTER — TELEPHONE (OUTPATIENT)
Dept: ORTHOPEDICS | Facility: CLINIC | Age: 64
End: 2019-12-13

## 2019-12-13 NOTE — TELEPHONE ENCOUNTER
RN called and spoke with Moe.  He would like the report sent to his MD, Dr. Phil Palomares at [facility].  He will schedule for his 3 month check up with CT Chewt  To be done on  03-.  We will reach out and coordinate for him.  He will call with any concerns.  He states that he feels great, no cold, flu or fevers.

## 2020-03-03 ENCOUNTER — TELEPHONE (OUTPATIENT)
Dept: ORTHOPEDICS | Facility: CLINIC | Age: 65
End: 2020-03-03

## 2020-03-03 DIAGNOSIS — C41.9 CHONDROSARCOMA (H): Primary | ICD-10-CM

## 2020-03-03 NOTE — TELEPHONE ENCOUNTER
Called patient and scheduled his follow up. We will coordinate his imaging and let him know what time his appointments are for that. Patient was agreeable and appreciative.

## 2020-03-03 NOTE — TELEPHONE ENCOUNTER
[facility]    Phone Message    May a detailed message be left on voicemail: yes     Reason for Call: Other: Pt would like Raysa to give him a call back regarding appt he thought he had tomorrow on 03/04/2020. Please contact pt to discuss     Action Taken: Message routed to:  [facility]    Travel Screening: Not Applicable

## 2020-03-09 ENCOUNTER — TELEPHONE (OUTPATIENT)
Dept: ORTHOPEDICS | Facility: CLINIC | Age: 65
End: 2020-03-09

## 2020-03-09 NOTE — TELEPHONE ENCOUNTER
Health Call Center    Phone Message    May a detailed message be left on voicemail: no     Reason for Call: Other: Pt's wife just spoke with Raysa over the phone today about the Pt's appts on 3/11 w/ Dr. Walker and needs to provide some more information. She declined to provide me the information she wants to provide Raysa and would like Raysa to call her asap. Please call Pt's wife back.     Action Taken: Message routed to:  [facility]    Travel Screening: Not Applicable

## 2020-03-09 NOTE — TELEPHONE ENCOUNTER
Health Call Center     Phone Message     May a detailed message be left on voicemail: no      Reason for Call: Other: Pt's wife just spoke with Raysa over the phone today about the Pt's appts on 3/11 w/ Dr. Walker and needs to provide some more information. She declined to provide me the information she wants to provide Raysa and would like Raysa to call her asap because they may have to cancel the appointment. Please call Pt's wife back.      Action Taken: Message routed to:  [facility]     Travel Screening: Not Applicable

## 2020-03-09 NOTE — TELEPHONE ENCOUNTER
[facility]    Phone Message    May a detailed message be left on voicemail: yes     Reason for Call: Other: Patient's wife, Bruno, would like a call back from Raysa - in regard to patient's appt this week.  Bruno would not give me anymore details.  Please have Raysa follow up with Bruno.  Thank you!     Action Taken: Message routed to:  [facility]    Travel Screening: Not Applicable

## 2020-03-11 NOTE — TELEPHONE ENCOUNTER
RN returned call to Bruno.  She states that they changed insurance due to Moe's custodial.  Imaging cancelled due to need pre authorization with this new insurance.  They will rescheduled when this has been done.

## 2020-03-22 ENCOUNTER — HEALTH MAINTENANCE LETTER (OUTPATIENT)
Age: 65
End: 2020-03-22

## 2020-11-19 ENCOUNTER — TELEPHONE (OUTPATIENT)
Dept: ORTHOPEDICS | Facility: CLINIC | Age: 65
End: 2020-11-19

## 2020-11-19 NOTE — TELEPHONE ENCOUNTER
RN returned call to Moe.  He is to have his yearly CT chest and MRI and then Dr. Walker would like him to see a Pulmonologist.  RN will put referral in for that.  Please call back with questions.

## 2020-12-23 ENCOUNTER — ANCILLARY PROCEDURE (OUTPATIENT)
Dept: CT IMAGING | Facility: CLINIC | Age: 65
End: 2020-12-23
Attending: ORTHOPAEDIC SURGERY
Payer: COMMERCIAL

## 2020-12-23 ENCOUNTER — ANCILLARY PROCEDURE (OUTPATIENT)
Dept: MRI IMAGING | Facility: CLINIC | Age: 65
End: 2020-12-23
Attending: ORTHOPAEDIC SURGERY
Payer: COMMERCIAL

## 2020-12-23 ENCOUNTER — OFFICE VISIT (OUTPATIENT)
Dept: ORTHOPEDICS | Facility: CLINIC | Age: 65
End: 2020-12-23
Payer: COMMERCIAL

## 2020-12-23 DIAGNOSIS — C41.9 CHONDROSARCOMA (H): ICD-10-CM

## 2020-12-23 PROCEDURE — A9585 GADOBUTROL INJECTION: HCPCS | Performed by: RADIOLOGY

## 2020-12-23 PROCEDURE — 99213 OFFICE O/P EST LOW 20 MIN: CPT | Performed by: ORTHOPAEDIC SURGERY

## 2020-12-23 PROCEDURE — 71250 CT THORAX DX C-: CPT | Performed by: RADIOLOGY

## 2020-12-23 PROCEDURE — 72157 MRI CHEST SPINE W/O & W/DYE: CPT | Mod: GC | Performed by: RADIOLOGY

## 2020-12-23 RX ORDER — GADOBUTROL 604.72 MG/ML
10 INJECTION INTRAVENOUS ONCE
Status: COMPLETED | OUTPATIENT
Start: 2020-12-23 | End: 2020-12-23

## 2020-12-23 RX ADMIN — GADOBUTROL 10 ML: 604.72 INJECTION INTRAVENOUS at 11:16

## 2020-12-23 NOTE — PROGRESS NOTES
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  He has not noticed any spine or pulmonary symptoms.  He has noticed that his right arm and face are dry when the left side is sweating.  He also notices a difference in how warm his right and left hands are.  This is something he is noticed more in the last year or 2.  He is retired.  He had a labral injury which was noticed several years ago before his tumor was first discovered which has never been addressed but his right shoulder does not bother him too much.  Also he is having surgery for prostate cancer later this week.    On examination he is alert oriented has a normal mood and affect and is in no acute distress.  Respirations are regular and unlabored eyes are nonicteric.  He has a normal motion of the shoulder elbow wrist and hand.    I reviewed his CT scan MRI and the reports of each hip.  There is no sign of local recurrence.  He has a nodular opacities in his lungs.  Those that were visible a year ago are gone and he has a smaller area of new opacities this year.  These do not seem like a tumor in origin and he does not seem to be symptom medic from them.  We talked about potentially visiting a pulmonologist to sort this out but it does not seem urgent.  He will consider this after his recovery from his prostate surgery.    I will see him back in 1 year with a repeat scans of his upper thoracic spine and CT scan of the chest.  I have answered all of his questions today.

## 2020-12-23 NOTE — NURSING NOTE
Reason For Visit:   Chief Complaint   Patient presents with     RECHECK     followup chondrosarcoma // review MRI and CT        There were no vitals taken for this visit.    Pain Assessment  Patient Currently in Pain: No(no pain per pt)      Stephanie Sylvester ATC

## 2020-12-23 NOTE — LETTER
12/23/2020         RE: Felice Boyle  [address]  [facility] [address]        Dear Colleague,    Thank you for referring your patient, Felice Boyle, to the [facility] [place]. Please see a copy of my visit note below.    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  He has not noticed any spine or pulmonary symptoms.  He has noticed that his right arm and face are dry when the left side is sweating.  He also notices a difference in how warm his right and left hands are.  This is something he is noticed more in the last year or 2.  He is retired.  He had a labral injury which was noticed several years ago before his tumor was first discovered which has never been addressed but his right shoulder does not bother him too much.  Also he is having surgery for prostate cancer later this week.    On examination he is alert oriented has a normal mood and affect and is in no acute distress.  Respirations are regular and unlabored eyes are nonicteric.  He has a normal motion of the shoulder elbow wrist and hand.    I reviewed his CT scan MRI and the reports of each hip.  There is no sign of local recurrence.  He has a nodular opacities in his lungs.  Those that were visible a year ago are gone and he has a smaller area of new opacities this year.  These do not seem like a tumor in origin and he does not seem to be symptom medic from them.  We talked about potentially visiting a pulmonologist to sort this out but it does not seem urgent.  He will consider this after his recovery from his prostate surgery.    I will see him back in 1 year with a repeat scans of his upper thoracic spine and CT scan of the chest.  I have answered all of his questions today.          Jeremy Walker MD

## 2020-12-23 NOTE — DISCHARGE INSTRUCTIONS
MRI Contrast Discharge Instructions    The IV contrast you received today will pass out of your body in your  urine. This will happen in the next 24 hours. You will not feel this process.  Your urine will not change color.    Drink at least 4 extra glasses of water or juice today (unless your doctor  has restricted your fluids). This reduces the stress on your kidneys.  You may take your regular medicines.    If you are on dialysis: It is best to have dialysis today.    If you have a reaction: Most reactions happen right away. If you have  any new symptoms after leaving the hospital (such as hives or swelling),  call your hospital at the correct number below. Or call your family doctor.  If you have breathing distress or wheezing, call 911.    Special instructions: ***    I have read and understand the above information.    Signature:______________________________________ Date:___________    Staff:__________________________________________ Date:___________     Time:__________    [facility] Departments:    ___[place]: [phone]  ___[facility]: [phone]  ___[place]: [phone] ___[facility]: [phone]  ___[facility]: [phone]  ___[facility]: [phone]  ___[phone]  ___[facility]: [phone]  ___[phone]

## 2021-05-15 ENCOUNTER — HEALTH MAINTENANCE LETTER (OUTPATIENT)
Age: 66
End: 2021-05-15

## 2021-09-04 ENCOUNTER — HEALTH MAINTENANCE LETTER (OUTPATIENT)
Age: 66
End: 2021-09-04

## 2022-06-11 ENCOUNTER — HEALTH MAINTENANCE LETTER (OUTPATIENT)
Age: 67
End: 2022-06-11

## 2022-06-28 ENCOUNTER — TELEPHONE (OUTPATIENT)
Dept: ORTHOPEDICS | Facility: CLINIC | Age: 67
End: 2022-06-28

## 2022-06-28 DIAGNOSIS — C41.9 CHONDROSARCOMA (H): Primary | ICD-10-CM

## 2022-06-28 NOTE — TELEPHONE ENCOUNTER
Health Call Center    Phone Message    May a detailed message be left on voicemail: yes     Reason for Call: Order(s): Other:   Reason for requested: MRI & CT scan for annual check up  Date needed: within next couple of weeks  Provider name: Dr. Walker    Patient sent a [employer] request to have imaging done on same day as appt with Dr. Walker in September.      Patient is now waiting on new order to schedule imaging.    Action Taken: Message routed to:  [facility]o    Travel Screening: Not Applicable

## 2022-06-28 NOTE — TELEPHONE ENCOUNTER
Health Call Center    Phone Message    May a detailed message be left on voicemail: yes     Reason for Call: Other: Pt calling back in requesting call back from Raysa re: MRi/ CT-Scan and also apt with  in september.  Pt has questions.      Action Taken: Message routed to:  [facility] orthopedic - Dr. Walker     Travel Screening: Not Applicable

## 2022-06-28 NOTE — TELEPHONE ENCOUNTER
ATC put in the orders for his scans.  Called the patient and his wife and gave imaging number to him.  They informed that the patient would like to talk to Raysa directly.  They would like Raysa to call him at [phone].  ATC informed that [facility] will relay this to Raysa.          -YOLANDA Avila- Orthopedics

## 2022-06-29 NOTE — TELEPHONE ENCOUNTER
Moe,   I was able to schedule your MRI at 0730, check in at 0700, then CT Chest to follow at 0820.  Come on up then to see Dr. Walker.  You are all set.  Sorry this is your phone call back.  Happy 4th and take care,  Raysa

## 2022-09-07 ENCOUNTER — OFFICE VISIT (OUTPATIENT)
Dept: ORTHOPEDICS | Facility: CLINIC | Age: 67
End: 2022-09-07
Payer: COMMERCIAL

## 2022-09-07 ENCOUNTER — ANCILLARY PROCEDURE (OUTPATIENT)
Dept: CT IMAGING | Facility: CLINIC | Age: 67
End: 2022-09-07
Attending: ORTHOPAEDIC SURGERY
Payer: COMMERCIAL

## 2022-09-07 VITALS — BODY MASS INDEX: 35.84 KG/M2 | HEIGHT: 71 IN | WEIGHT: 256 LBS

## 2022-09-07 DIAGNOSIS — C41.9 CHONDROSARCOMA (H): ICD-10-CM

## 2022-09-07 DIAGNOSIS — C41.9 CHONDROSARCOMA (H): Primary | ICD-10-CM

## 2022-09-07 PROCEDURE — 99213 OFFICE O/P EST LOW 20 MIN: CPT | Performed by: ORTHOPAEDIC SURGERY

## 2022-09-07 PROCEDURE — 71250 CT THORAX DX C-: CPT | Performed by: RADIOLOGY

## 2022-09-07 NOTE — PROGRESS NOTES
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  He has no complaints today.    On examination he is alert oriented has normal mood and affect and is in no acute distress peer respirations are regular and unlabored eyes are nonicteric.  He has a normal gait.    CT scan shows no sign of metastatic disease in the lungs.  I reviewed the MRI and cannot detect any obvious local recurrence.    We will plan to do yearly checks this patient till the 10-year christy.  His next CT chest and MRI of the upper thoracic spine will be in 12 months.  I have answered all of his questions today.

## 2022-09-07 NOTE — NURSING NOTE
"Reason For Visit:   Chief Complaint   Patient presents with     RECHECK     Followup thoracic spine // review MRI and CT       Ht 1.81 m (5' 11.26\")   Wt 116.1 kg (256 lb)   BMI 35.44 kg/m      Pain Assessment  Patient Currently in Pain: Denies (no pain per pt)      Austin Sylvester ATC    "

## 2022-09-07 NOTE — LETTER
9/7/2022         RE: Felice Boyle  [address]  [facility] [address]        Dear Colleague,    Thank you for referring your patient, Felice Boyle, to the [facility] [place]. Please see a copy of my visit note below.    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  He has no complaints today.    On examination he is alert oriented has normal mood and affect and is in no acute distress peer respirations are regular and unlabored eyes are nonicteric.  He has a normal gait.    CT scan shows no sign of metastatic disease in the lungs.  I reviewed the MRI and cannot detect any obvious local recurrence.    We will plan to do yearly checks this patient till the 10-year christy.  His next CT chest and MRI of the upper thoracic spine will be in 12 months.  I have answered all of his questions today.      Jeremy Walker MD

## 2022-09-07 NOTE — LETTER
Verification of Appointment  2022     Seen today: yes    Patient:  Felice Boyle  :   1955  MRN:     [id number]  Physician: LINH KENNY    Felice Boyle was seen in orthopedic surgery clinic today.  We reviewed his imaging studies.  After review of CAT scan of his lungs and the report, his lungs show no sign of metastatic disease.  The previous abnormalities in his lungs have completely resolved.  I also reviewed the MRI of the thoracic spine and I do not see any sign of recurrence of his tumor.    Sincerely,                Electronically signed by Linh Kenny MD

## 2022-10-22 ENCOUNTER — HEALTH MAINTENANCE LETTER (OUTPATIENT)
Age: 67
End: 2022-10-22

## 2023-06-18 ENCOUNTER — HEALTH MAINTENANCE LETTER (OUTPATIENT)
Age: 68
End: 2023-06-18

## 2023-07-12 ENCOUNTER — TRANSFERRED RECORDS (OUTPATIENT)
Dept: HEALTH INFORMATION MANAGEMENT | Facility: CLINIC | Age: 68
End: 2023-07-12
Payer: COMMERCIAL

## 2023-08-08 DIAGNOSIS — C41.9 CHONDROSARCOMA (H): Primary | ICD-10-CM

## 2023-08-15 ENCOUNTER — TELEPHONE (OUTPATIENT)
Dept: ORTHOPEDICS | Facility: CLINIC | Age: 68
End: 2023-08-15
Payer: COMMERCIAL

## 2023-08-15 NOTE — TELEPHONE ENCOUNTER
VM left requesting patient call to schedule MRI and CT scan and clinic for follow up appointment with Dr. Walker.    Blessing Bullard LPN

## 2023-09-27 ENCOUNTER — OFFICE VISIT (OUTPATIENT)
Dept: ORTHOPEDICS | Facility: CLINIC | Age: 68
End: 2023-09-27
Payer: COMMERCIAL

## 2023-09-27 ENCOUNTER — HOSPITAL ENCOUNTER (OUTPATIENT)
Dept: MRI IMAGING | Facility: CLINIC | Age: 68
Discharge: HOME | End: 2023-09-27
Attending: ORTHOPAEDIC SURGERY
Payer: COMMERCIAL

## 2023-09-27 ENCOUNTER — HOSPITAL ENCOUNTER (OUTPATIENT)
Dept: CT IMAGING | Facility: CLINIC | Age: 68
Discharge: HOME | End: 2023-09-27
Attending: ORTHOPAEDIC SURGERY
Payer: COMMERCIAL

## 2023-09-27 DIAGNOSIS — C41.9 CHONDROSARCOMA (H): Primary | ICD-10-CM

## 2023-09-27 DIAGNOSIS — C41.9 CHONDROSARCOMA (H): ICD-10-CM

## 2023-09-27 DIAGNOSIS — C79.51 CANCER, METASTATIC TO BONE (H): ICD-10-CM

## 2023-09-27 DIAGNOSIS — C61 PROSTATE CANCER (H): ICD-10-CM

## 2023-09-27 LAB — RADIOLOGIST FLAGS: ABNORMAL

## 2023-09-27 PROCEDURE — 99214 OFFICE O/P EST MOD 30 MIN: CPT | Performed by: ORTHOPAEDIC SURGERY

## 2023-09-27 PROCEDURE — 255N000002 HC RX 255 OP 636: Mod: JZ | Performed by: ORTHOPAEDIC SURGERY

## 2023-09-27 PROCEDURE — 72157 MRI CHEST SPINE W/O & W/DYE: CPT | Mod: 26 | Performed by: RADIOLOGY

## 2023-09-27 PROCEDURE — 72157 MRI CHEST SPINE W/O & W/DYE: CPT

## 2023-09-27 PROCEDURE — 71250 CT THORAX DX C-: CPT

## 2023-09-27 PROCEDURE — A9585 GADOBUTROL INJECTION: HCPCS | Mod: JZ | Performed by: ORTHOPAEDIC SURGERY

## 2023-09-27 PROCEDURE — 71250 CT THORAX DX C-: CPT | Mod: 26 | Performed by: RADIOLOGY

## 2023-09-27 RX ORDER — GADOBUTROL 604.72 MG/ML
10 INJECTION INTRAVENOUS ONCE
Status: COMPLETED | OUTPATIENT
Start: 2023-09-27 | End: 2023-09-27

## 2023-09-27 RX ADMIN — GADOBUTROL 10 ML: 604.72 INJECTION INTRAVENOUS at 08:23

## 2023-09-27 NOTE — LETTER
9/27/2023         RE: Felice Boyle  [id number]  [facility] [address]        Dear Colleague,    Thank you for referring your patient, Felice Boyle, to the [facility] [place]. Please see a copy of my visit note below.    This 68-year-old man is 6 years and 10 months out from resection left third rib chondrosarcoma.  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    On examination he is alert oriented in his usual mood and affect and in no acute distress.  He is able to ambulate comfortably without an assistive device.    I reviewed his CT scan report and there is no sign of metastatic disease in the lungs.  There were some areas on his thoracic spine of concern but this read was not available during the patient's clinic visit.  Subsequently we received report that there is some concern for metastatic lesions in the spine.    I told this patient that if the spine showed additional lesions he may need a biopsy to confirm that these are related to his prostate cancer and not chondrosarcoma.  This determination would have a big impact on how these things are managed both medically and with radiation.  Given that we had these results after clinic we will [place] back with the patient and propose a guided needle biopsy of the most affected vertebral body which seems to be T7 at one of the pedicles.      Again, thank you for allowing me to participate in the care of your patient.        Sincerely,        Jeremy Walker MD

## 2023-09-27 NOTE — NURSING NOTE
Reason For Visit:   Chief Complaint   Patient presents with    RECHECK     Restaging follow up with MRI and CT        There were no vitals taken for this visit.    Pain Assessment  Patient Currently in Pain: Yes  0-10 Pain Scale: 2      Austin Sylvester ATC

## 2023-09-27 NOTE — PROGRESS NOTES
This 68-year-old man is 6 years and 10 months out from resection left third rib chondrosarcoma.  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    On examination he is alert oriented in his usual mood and affect and in no acute distress.  He is able to ambulate comfortably without an assistive device.    I reviewed his CT scan report and there is no sign of metastatic disease in the lungs.  There were some areas on his thoracic spine of concern but this read was not available during the patient's clinic visit.  Subsequently we received report that there is some concern for metastatic lesions in the spine.    I told this patient that if the spine showed additional lesions he may need a biopsy to confirm that these are related to his prostate cancer and not chondrosarcoma.  This determination would have a big impact on how these things are managed both medically and with radiation.  Given that we had these results after clinic we will [place] back with the patient and propose a guided needle biopsy of the most affected vertebral body which seems to be T7 at one of the pedicles.

## 2023-09-28 ENCOUNTER — TELEPHONE (OUTPATIENT)
Dept: ORTHOPEDICS | Facility: CLINIC | Age: 68
End: 2023-09-28
Payer: COMMERCIAL

## 2023-09-28 DIAGNOSIS — C41.9 CHONDROSARCOMA (H): Primary | ICD-10-CM

## 2023-09-28 DIAGNOSIS — C41.9 CHONDROSARCOMA (H): ICD-10-CM

## 2023-09-28 NOTE — CONSULTS
Outpatient Neuroradiology Biopsy Referral    Patient is a 67 y/o male with a  PMH of pulmonary nodules, chondrosarcoma of the ribs, prostate cancer s/p radical prostatectomy followed by  Urology. Neuroradiology has been asked to biopsy of the lesion at T7 concerns for metastatic disease.    MRI 9/27/23 Impression:   1. Multiple new lesions within the thoracic vertebrae, most  conspicuous at T6-T8. These are suspicious for metastatic disease.  Recommend correlation with whole-body nuclear medicine bone scan and  prostate-specific antigen level.  2. No local recurrence of chondrosarcoma at site of prior recurrence.  3. Multilevel thoracic degenerative changes without high-grade spinal  canal or neural foraminal stenosis.    Multiple T1 hypointense and STIR hyperintense lesions of the thoracic  vertebrae, most conspicuous within the posterior aspects of the  vertebral bodies of T6-T8, new from the 9/7/2022 exam. Mild associated  enhancement. The lesion in the posterior aspect of the T7 vertebral  body on the right extends into the right pedicle, and there is  multifocal enhancement within the adjacent right rib and bilateral  transverse processes (series 8, images 28-30).      Stable appearing small posterior disc bulge at T7/T8 which indents the  central cord and contributes to mild spinal canal narrowing.  Multilevel facet hypertrophy with mild associated neural foraminal  narrowing.    Case and imaging MRI 9/27/23 was reviewed with DR. Doherty and Dr. Valentín Hernandez from Neuroradiology and CT guided biopsy of T7 is approved.     Procedure order, surgical pathology and leukemia lymphoma orders placed.    Not on AC, no meds listed in EPIC medications, reviewed care everywhere  summary.    If requesting team would like samples sent for anything else please enter them or notify Neuroradiology prior to scheduled procedure.    Primary team Dr. Walker Ortho Surg made aware of Neuroradiology recommendations via epic  messaging.    DOLORES Brady CNP  Interventional Radiology   IR on-call pager: [phone]

## 2023-09-28 NOTE — TELEPHONE ENCOUNTER
----- Message from Jeremy Walker MD sent at 9/27/2023 10:12 PM CDT -----  Margarita or whoever is covering for you,    I talked to this patient in clinic about his thoracic spine MRI.  He has a history of chondrosarcoma but new diagnosis of prostate cancer.  After his clinic visit the report from his spine MRI came back as being concerning for metastatic disease.  I told the patient that if the report showed this then we would need to go ahead with a CT-guided needle biopsy.  I think T7 is the largest lesion.  Can you please put an order for interventional radiology for CT-guided needle biopsy of the lesion at T7.  I will try to call him 9/28 between surgeries.  Thanks.

## 2023-10-12 ENCOUNTER — MYC MEDICAL ADVICE (OUTPATIENT)
Dept: INTERVENTIONAL RADIOLOGY/VASCULAR | Facility: CLINIC | Age: 68
End: 2023-10-12
Payer: COMMERCIAL

## 2023-10-16 ENCOUNTER — APPOINTMENT (OUTPATIENT)
Dept: MEDSURG UNIT | Facility: CLINIC | Age: 68
End: 2023-10-16
Attending: ORTHOPAEDIC SURGERY
Payer: COMMERCIAL

## 2023-10-16 ENCOUNTER — APPOINTMENT (OUTPATIENT)
Dept: INTERVENTIONAL RADIOLOGY/VASCULAR | Facility: CLINIC | Age: 68
End: 2023-10-16
Attending: ORTHOPAEDIC SURGERY
Payer: COMMERCIAL

## 2023-10-16 ENCOUNTER — HOSPITAL ENCOUNTER (OUTPATIENT)
Facility: CLINIC | Age: 68
Discharge: HOME | End: 2023-10-16
Attending: ORTHOPAEDIC SURGERY | Admitting: RADIOLOGY
Payer: COMMERCIAL

## 2023-10-16 VITALS
HEIGHT: 71 IN | WEIGHT: 252.43 LBS | DIASTOLIC BLOOD PRESSURE: 54 MMHG | SYSTOLIC BLOOD PRESSURE: 82 MMHG | OXYGEN SATURATION: 96 % | RESPIRATION RATE: 18 BRPM | HEART RATE: 58 BPM | TEMPERATURE: 99.1 F | BODY MASS INDEX: 35.34 KG/M2

## 2023-10-16 DIAGNOSIS — C41.9 CHONDROSARCOMA (H): ICD-10-CM

## 2023-10-16 LAB
ATRIAL RATE - MUSE: 46 BPM
DIASTOLIC BLOOD PRESSURE - MUSE: NORMAL MMHG
ERYTHROCYTE [DISTWIDTH] IN BLOOD BY AUTOMATED COUNT: 13.2 % (ref 10–15)
HCT VFR BLD AUTO: 43.9 % (ref 40–53)
HGB BLD-MCNC: 14.5 G/DL (ref 13.3–17.7)
INR PPP: 0.94 (ref 0.85–1.15)
INTERPRETATION ECG - MUSE: NORMAL
MCH RBC QN AUTO: 30.7 PG (ref 26.5–33)
MCHC RBC AUTO-ENTMCNC: 33 G/DL (ref 31.5–36.5)
MCV RBC AUTO: 93 FL (ref 78–100)
P AXIS - MUSE: 42 DEGREES
PLATELET # BLD AUTO: 203 10E3/UL (ref 150–450)
PR INTERVAL - MUSE: 176 MS
QRS DURATION - MUSE: 82 MS
QT - MUSE: 514 MS
QTC - MUSE: 449 MS
R AXIS - MUSE: -16 DEGREES
RBC # BLD AUTO: 4.72 10E6/UL (ref 4.4–5.9)
SYSTOLIC BLOOD PRESSURE - MUSE: NORMAL MMHG
T AXIS - MUSE: 2 DEGREES
VENTRICULAR RATE- MUSE: 46 BPM
WBC # BLD AUTO: 5.3 10E3/UL (ref 4–11)

## 2023-10-16 PROCEDURE — 85027 COMPLETE CBC AUTOMATED: CPT | Performed by: NURSE PRACTITIONER

## 2023-10-16 PROCEDURE — 88342 IMHCHEM/IMCYTCHM 1ST ANTB: CPT | Mod: 26 | Performed by: PATHOLOGY

## 2023-10-16 PROCEDURE — 99152 MOD SED SAME PHYS/QHP 5/>YRS: CPT

## 2023-10-16 PROCEDURE — 93005 ELECTROCARDIOGRAM TRACING: CPT

## 2023-10-16 PROCEDURE — 88307 TISSUE EXAM BY PATHOLOGIST: CPT | Mod: 26 | Performed by: PATHOLOGY

## 2023-10-16 PROCEDURE — 250N000009 HC RX 250: Performed by: STUDENT IN AN ORGANIZED HEALTH CARE EDUCATION/TRAINING PROGRAM

## 2023-10-16 PROCEDURE — 77012 CT SCAN FOR NEEDLE BIOPSY: CPT | Mod: 26 | Performed by: RADIOLOGY

## 2023-10-16 PROCEDURE — 999N000133 HC STATISTIC PP CARE STAGE 2

## 2023-10-16 PROCEDURE — 88311 DECALCIFY TISSUE: CPT | Mod: 26 | Performed by: PATHOLOGY

## 2023-10-16 PROCEDURE — 20225 BONE BIOPSY TROCAR/NDL DEEP: CPT | Mod: GC | Performed by: RADIOLOGY

## 2023-10-16 PROCEDURE — 85610 PROTHROMBIN TIME: CPT | Performed by: NURSE PRACTITIONER

## 2023-10-16 PROCEDURE — 88184 FLOWCYTOMETRY/ TC 1 MARKER: CPT | Performed by: PATHOLOGY

## 2023-10-16 PROCEDURE — 99152 MOD SED SAME PHYS/QHP 5/>YRS: CPT | Mod: GC | Performed by: RADIOLOGY

## 2023-10-16 PROCEDURE — 88341 IMHCHEM/IMCYTCHM EA ADD ANTB: CPT | Mod: 26 | Performed by: PATHOLOGY

## 2023-10-16 PROCEDURE — 88307 TISSUE EXAM BY PATHOLOGIST: CPT | Mod: TC | Performed by: ORTHOPAEDIC SURGERY

## 2023-10-16 PROCEDURE — 36415 COLL VENOUS BLD VENIPUNCTURE: CPT | Performed by: NURSE PRACTITIONER

## 2023-10-16 PROCEDURE — 999N000054 HC STATISTIC EKG NON-CHARGEABLE

## 2023-10-16 PROCEDURE — 258N000003 HC RX IP 258 OP 636: Performed by: NURSE PRACTITIONER

## 2023-10-16 PROCEDURE — 250N000011 HC RX IP 250 OP 636: Performed by: STUDENT IN AN ORGANIZED HEALTH CARE EDUCATION/TRAINING PROGRAM

## 2023-10-16 PROCEDURE — 88185 FLOWCYTOMETRY/TC ADD-ON: CPT | Performed by: ORTHOPAEDIC SURGERY

## 2023-10-16 PROCEDURE — 272N000155 HC KIT CR15

## 2023-10-16 PROCEDURE — 88184 FLOWCYTOMETRY/ TC 1 MARKER: CPT | Performed by: ORTHOPAEDIC SURGERY

## 2023-10-16 PROCEDURE — 88189 FLOWCYTOMETRY/READ 16 & >: CPT | Mod: GC | Performed by: PATHOLOGY

## 2023-10-16 PROCEDURE — 999N000142 HC STATISTIC PROCEDURE PREP ONLY

## 2023-10-16 RX ORDER — ACETAMINOPHEN 325 MG/1
650 TABLET ORAL EVERY 4 HOURS PRN
Status: DISCONTINUED | OUTPATIENT
Start: 2023-10-16 | End: 2023-10-16 | Stop reason: HOSPADM

## 2023-10-16 RX ORDER — NALOXONE HYDROCHLORIDE 0.4 MG/ML
0.4 INJECTION, SOLUTION INTRAMUSCULAR; INTRAVENOUS; SUBCUTANEOUS
Status: DISCONTINUED | OUTPATIENT
Start: 2023-10-16 | End: 2023-10-16 | Stop reason: HOSPADM

## 2023-10-16 RX ORDER — FENTANYL CITRATE 50 UG/ML
25-50 INJECTION, SOLUTION INTRAMUSCULAR; INTRAVENOUS EVERY 5 MIN PRN
Status: DISCONTINUED | OUTPATIENT
Start: 2023-10-16 | End: 2023-10-16 | Stop reason: HOSPADM

## 2023-10-16 RX ORDER — NALOXONE HYDROCHLORIDE 0.4 MG/ML
0.2 INJECTION, SOLUTION INTRAMUSCULAR; INTRAVENOUS; SUBCUTANEOUS
Status: DISCONTINUED | OUTPATIENT
Start: 2023-10-16 | End: 2023-10-16 | Stop reason: HOSPADM

## 2023-10-16 RX ORDER — FLUMAZENIL 0.1 MG/ML
0.2 INJECTION, SOLUTION INTRAVENOUS
Status: DISCONTINUED | OUTPATIENT
Start: 2023-10-16 | End: 2023-10-16 | Stop reason: HOSPADM

## 2023-10-16 RX ORDER — ONDANSETRON 2 MG/ML
4 INJECTION INTRAMUSCULAR; INTRAVENOUS
Status: DISCONTINUED | OUTPATIENT
Start: 2023-10-16 | End: 2023-10-16 | Stop reason: HOSPADM

## 2023-10-16 RX ORDER — SODIUM CHLORIDE 9 MG/ML
INJECTION, SOLUTION INTRAVENOUS CONTINUOUS
Status: DISCONTINUED | OUTPATIENT
Start: 2023-10-16 | End: 2023-10-16 | Stop reason: HOSPADM

## 2023-10-16 RX ORDER — LIDOCAINE 40 MG/G
CREAM TOPICAL
Status: DISCONTINUED | OUTPATIENT
Start: 2023-10-16 | End: 2023-10-16 | Stop reason: HOSPADM

## 2023-10-16 RX ADMIN — FENTANYL CITRATE 25 MCG: 50 INJECTION, SOLUTION INTRAMUSCULAR; INTRAVENOUS at 10:45

## 2023-10-16 RX ADMIN — FENTANYL CITRATE 25 MCG: 50 INJECTION, SOLUTION INTRAMUSCULAR; INTRAVENOUS at 10:18

## 2023-10-16 RX ADMIN — FENTANYL CITRATE 25 MCG: 50 INJECTION, SOLUTION INTRAMUSCULAR; INTRAVENOUS at 10:37

## 2023-10-16 RX ADMIN — FENTANYL CITRATE 50 MCG: 50 INJECTION, SOLUTION INTRAMUSCULAR; INTRAVENOUS at 10:04

## 2023-10-16 RX ADMIN — MIDAZOLAM 1 MG: 1 INJECTION INTRAMUSCULAR; INTRAVENOUS at 10:03

## 2023-10-16 RX ADMIN — SODIUM CHLORIDE: 9 INJECTION, SOLUTION INTRAVENOUS at 08:49

## 2023-10-16 RX ADMIN — LIDOCAINE HYDROCHLORIDE 3 ML: 10 INJECTION, SOLUTION EPIDURAL; INFILTRATION; INTRACAUDAL; PERINEURAL at 10:27

## 2023-10-16 RX ADMIN — MIDAZOLAM 0.5 MG: 1 INJECTION INTRAMUSCULAR; INTRAVENOUS at 10:18

## 2023-10-16 RX ADMIN — MIDAZOLAM 0.5 MG: 1 INJECTION INTRAMUSCULAR; INTRAVENOUS at 10:37

## 2023-10-16 ASSESSMENT — ACTIVITIES OF DAILY LIVING (ADL)
ADLS_ACUITY_SCORE: 35

## 2023-10-16 NOTE — IR NOTE
Patient Name: Felice Boyle  Medical Record Number: [id number]  Today's Date: 10/16/2023    Procedure: Thoracic spine bone biopsy  Proceduralist: JANIA Anthony MD, ANNA Salas MD  Pathology present: No - 2 cores in surg path, 2 cores in flow cytometry  Brief completed: N/A    Procedure Start: 1015  Procedure end: 1052  Sedation medications administered: 2 mg of versed, 125 mcg of fentanyl  Sedation time: 48 minutes (3288-0978)    Report given to: Yuli SANDHU RN  : N/A    Other Notes: Pt arrived to IR room CT2 from . Consent reviewed. Pt denies any questions or concerns regarding procedure. Pt positioned prone and monitored per protocol. Pt tolerated procedure without any noted complications. Posterior thoracic site cleansed and dressed per protocol. Specimens collected per order. Pt transferred back to .

## 2023-10-16 NOTE — PROCEDURES
[facility]    Procedure: Ct guided right thoracic spine bone biopsy    Date/Time: 10/16/2023 10:59 AM    Performed by: Prakash Salas MD  Authorized by: Prakash Salas MD  IR Fellow Physician: Prakash Salas      UNIVERSAL PROTOCOL   Site Marked: Yes  Prior Images Obtained and Reviewed:  Yes  Required items: Required blood products, implants, devices and special equipment available    Patient identity confirmed:  Verbally with patient, arm band, provided demographic data and hospital-assigned identification number  Patient was reevaluated immediately before administering moderate or deep sedation or anesthesia  Confirmation Checklist:  Patient's identity using two indicators, relevant allergies, procedure was appropriate and matched the consent or emergent situation and correct equipment/implants were available  Time out: Immediately prior to the procedure a time out was called    Universal Protocol: the Joint Commission Universal Protocol was followed    Preparation: Patient was prepped and draped in usual sterile fashion    ESBL (mL):  1     ANESTHESIA    Anesthesia: Local infiltration  Local Anesthetic:  Lidocaine 1% without epinephrine  Anesthetic Total (mL):  4      SEDATION  Patient Sedated: Yes    Sedation:  Fentanyl and midazolam  Vital signs: Vital signs monitored during sedation    See dictated procedure note for full details.  Findings: 2 core bone biopsies of right thoracic spine lesion at T7    Specimens: core needle biopsy specimens sent for pathological analysis    Complications: None    Condition: Stable    Plan: 2 hr bedrest      PROCEDURE    Patient Tolerance:  Patient tolerated the procedure well with no immediate complications  Length of time physician/provider present for 1:1 monitoring during sedation: 45

## 2023-10-16 NOTE — DISCHARGE INSTRUCTIONS
[facility]    Interventional Radiology  Patient Instructions Following Biopsy    AFTER YOU GO HOME  If you were given sedation, for the first 24 hours: we recommend that an adult stay with you, DO NOT drive or operate machinery at home or at work, DO NOT smoke, DO NOT make any important or legal decisions.  DO NOT drink alcoholic beverages the day of your procedure  Drink plenty of fluids   Resume your regular diet, unless otherwise instructed by your Primary Physician  Relax and take it easy for 48 hours  DO NOT do any strenuous exercise or lifting (> 10 lbs) for at least 7 days following your procedure  Keep the dressing dry and in place for 24 hours. Replace with Band aid for 2 days.  Never leave a wet dressing in place.  Do not take a shower for at least 12 hours following your procedure. No tub bath, hot tub, or swimming for 5 days.  There should be minimum drainage from the biopsy site    CALL THE PHYSICIAN IF:  You start bleeding from the procedure site.  If you do start to bleed from that site, lie down flat and hold pressure on the site for a minimum of 10 minutes.  Your physician will tell you if you need to return to the hospital  You develop nausea or vomiting  You have excessive swelling, redness, or tenderness at the site  You have drainage that looks like it is infected.  You experience severe pain  You develop hives or a rash or unexplained itching  You develop shortness of breath  You develop a temperature of 101 degrees F or greater  You develop chest pain or cough up blood, lightheadedness or fainting    [facility] INTERVENTIONAL RADIOLOGY DEPARTMENT  Procedure Physician: Dr Salas    Date of procedure: October 16, 2023  Telephone Numbers: [phone]      Monday-Friday 7:30 am to 4:00 pm  [phone] After 4:00 pm Monday-Friday, Weekends & Holidays.   Ask for the Interventional Radiologist on call.  Someone is on call 24 hrs/day  [facility] toll free number: [phone] Monday-Friday  8:00 am to 4:30 pm  [facility] Emergency Dept: [phone]

## 2023-10-16 NOTE — PROGRESS NOTES
Pt returned from IR via liter accompanied by nurse at 1100.Mid upper back is where the biopsy site is located.Pt tolerating food and fluids.

## 2023-10-16 NOTE — PROGRESS NOTES
Patient tolerated recovery stage well. VSS, mid back biopsy  site clean/dry/intact, no hematoma, and denies pain. Patient tolerated PO food and fluids. Teaching was done and discharge instructions were given. Patient ambulated, voided, and PIV was removed. Patient discharged from the hospital to home with wife Bruno.

## 2023-10-16 NOTE — PRE-PROCEDURE
GENERAL PRE-PROCEDURE:   Procedure:  Ct guided thoracic spine bone biopsy  Date/Time:  10/16/2023 9:32 AM    Verbal consent obtained?: Yes    Written consent obtained?: Yes    Risks and benefits: Risks, benefits and alternatives were discussed    Consent given by:  Patient  Patient states understanding of procedure being performed: Yes    Patient's understanding of procedure matches consent: Yes    Procedure consent matches procedure scheduled: Yes    Expected level of sedation:  Moderate  Appropriately NPO:  Yes  Mallampati  :  Grade 1- soft palate, uvula, tonsillar pillars, and posterior pharyngeal wall visible  Lungs:  Lungs clear with good breath sounds bilaterally  Heart:  Normal heart sounds and rate  History & Physical reviewed:  History and physical reviewed and no updates needed  Statement of review:  I have reviewed the lab findings, diagnostic data, medications, and the plan for sedation

## 2023-10-17 LAB
PATH REPORT.COMMENTS IMP SPEC: NORMAL
PATH REPORT.FINAL DX SPEC: NORMAL
PATH REPORT.MICROSCOPIC SPEC OTHER STN: NORMAL
PATH REPORT.RELEVANT HX SPEC: NORMAL

## 2023-10-18 LAB
PATH REPORT.COMMENTS IMP SPEC: NORMAL
PATH REPORT.FINAL DX SPEC: NORMAL
PATH REPORT.GROSS SPEC: NORMAL
PATH REPORT.MICROSCOPIC SPEC OTHER STN: NORMAL
PATH REPORT.RELEVANT HX SPEC: NORMAL
PHOTO IMAGE: NORMAL

## 2023-10-20 DIAGNOSIS — C61 PROSTATE CANCER (H): Primary | ICD-10-CM

## 2024-08-11 ENCOUNTER — HEALTH MAINTENANCE LETTER (OUTPATIENT)
Age: 69
End: 2024-08-11

## 2024-08-27 DIAGNOSIS — C41.9 CHONDROSARCOMA (H): Primary | ICD-10-CM

## 2024-09-03 ENCOUNTER — MYC MEDICAL ADVICE (OUTPATIENT)
Dept: ORTHOPEDICS | Facility: CLINIC | Age: 69
End: 2024-09-03
Payer: COMMERCIAL

## 2024-09-12 NOTE — TELEPHONE ENCOUNTER
Left Voicemail (1st Attempt) for the patient to call back and schedule the following:    Appointment type: return [facility] tumor  Provider: Dr Walker  Return date: September 2024

## 2024-09-27 ENCOUNTER — ANCILLARY PROCEDURE (OUTPATIENT)
Dept: CT IMAGING | Facility: CLINIC | Age: 69
End: 2024-09-27
Attending: ORTHOPAEDIC SURGERY
Payer: COMMERCIAL

## 2024-09-27 ENCOUNTER — ANCILLARY PROCEDURE (OUTPATIENT)
Dept: MRI IMAGING | Facility: CLINIC | Age: 69
End: 2024-09-27
Attending: ORTHOPAEDIC SURGERY
Payer: COMMERCIAL

## 2024-09-27 ENCOUNTER — OFFICE VISIT (OUTPATIENT)
Dept: ORTHOPEDICS | Facility: CLINIC | Age: 69
End: 2024-09-27
Payer: COMMERCIAL

## 2024-09-27 DIAGNOSIS — C41.9 CHONDROSARCOMA (H): ICD-10-CM

## 2024-09-27 DIAGNOSIS — C41.9 CHONDROSARCOMA (H): Primary | ICD-10-CM

## 2024-09-27 DIAGNOSIS — C79.51 CANCER, METASTATIC TO BONE (H): ICD-10-CM

## 2024-09-27 PROCEDURE — 72157 MRI CHEST SPINE W/O & W/DYE: CPT | Performed by: RADIOLOGY

## 2024-09-27 PROCEDURE — A9585 GADOBUTROL INJECTION: HCPCS | Performed by: RADIOLOGY

## 2024-09-27 PROCEDURE — 71250 CT THORAX DX C-: CPT | Mod: GC | Performed by: RADIOLOGY

## 2024-09-27 PROCEDURE — 99213 OFFICE O/P EST LOW 20 MIN: CPT | Mod: GC | Performed by: ORTHOPAEDIC SURGERY

## 2024-09-27 RX ORDER — GADOBUTROL 604.72 MG/ML
15 INJECTION INTRAVENOUS ONCE
Status: COMPLETED | OUTPATIENT
Start: 2024-09-27 | End: 2024-09-27

## 2024-09-27 RX ADMIN — GADOBUTROL 11 ML: 604.72 INJECTION INTRAVENOUS at 13:19

## 2024-09-27 NOTE — LETTER
9/27/2024      Felice Boyle  [address]  [facility] [address]      Dear Colleague,    Thank you for referring your patient, Felice Boyle, to the [facility]THOPED[facility] [place]. Please see a copy of my visit note below.    Orthopaedic Clinic Note    Surgery: Left third rib chondrosarcoma resection  Date of Surgery: 10/10/2016  Pathology: Chondrosarcoma    Chief Complaint: Follow-up for imaging review    History of Present Illness: Moe is a 69-year-old male status post above surgery for above pathology, also has a history of metastatic prostate cancer presenting today for follow-up of imaging results.  Since his last visit, patient notes he has been doing well.  He continues to follow with oncology for his metastatic prostate cancer.  He is taking Eligard for prostate cancer.    Exam  There were no vitals taken for this visit.  General: awake, alert, no acute distress  Respiratory: nonlabored on room air  Cardiovascular: regular rate and rhythm by peripheral pulse  On exam, sitting comfortably without signs of distress or pain.  Ambulates without antalgic gait or appreciable deficits.    Imaging:   MRI thoracic spine  There are multiple lesions within the thoracic spine, similar to prior MRI on 9/27/2023 but the majority of these areas are less hyperintense, and some appear to have resolved    CT chest  There are no new appreciable masses within the lung or chest wall    Assessment: Moe is a 69-year-old male with history of metastatic prostate cancer approximately 8 years s/p above surgery for above pathology.     Patient continues to do well.  No evidence of recurrence on imaging.  Plan for patient follow-up in 1 year with repeat imaging.  Will continue annual imaging for the next 2 years.  All questions answered.    Plan:   - Follow-up in 1 year with repeat imaging      Seen and discussed with Dr Walker who is in agreement with this assessment and plan    Wesley Anderson  MD  Orthopaedic Surgery, PGY4         I was present with the resident during the history and exam.  I discussed the case with the resident and agree with the findings as documented in the assessment and plan.      Again, thank you for allowing me to participate in the care of your patient.        Sincerely,        Jeremy Walker MD

## 2024-09-27 NOTE — NURSING NOTE
Reason For Visit:   Chief Complaint   Patient presents with    RECHECK     MRI & CT results        There were no vitals taken for this visit.    Pain Assessment  Patient Currently in Pain: Giacomo Laguna CMA

## 2024-09-27 NOTE — DISCHARGE INSTRUCTIONS
MRI Contrast Discharge Instructions    The IV contrast you received today will pass out of your body in your  urine. This will happen in the next 24 hours. You will not feel this process.  Your urine will not change color.    Drink at least 4 extra glasses of water or juice today (unless your doctor  has restricted your fluids). This reduces the stress on your kidneys.  You may take your regular medicines.    If you are on dialysis: It is best to have dialysis today.    If you have a reaction: Most reactions happen right away. If you have  any new symptoms after leaving the hospital (such as hives or swelling),  call your hospital at the correct number below. Or call your family doctor.  If you have breathing distress or wheezing, call 911.    Special instructions: ***    I have read and understand the above information.    Signature:______________________________________ Date:___________    Staff:__________________________________________ Date:___________     Time:__________    [place] [facility] Departments:    ___[place]: [phone]  ___[facility]: [phone]  ___[place]: [phone] ___[facility]: [phone]  ___[facility]: [phone]  ___[facility]: [phone]  ___[phone]  ___[facility]: [phone]  ___[phone]

## 2024-09-27 NOTE — PROGRESS NOTES
Orthopaedic Clinic Note    Surgery: Left third rib chondrosarcoma resection  Date of Surgery: 10/10/2016  Pathology: Chondrosarcoma    Chief Complaint: Follow-up for imaging review    History of Present Illness: Moe is a 69-year-old male status post above surgery for above pathology, also has a history of metastatic prostate cancer presenting today for follow-up of imaging results.  Since his last visit, patient notes he has been doing well.  He continues to follow with oncology for his metastatic prostate cancer.  He is taking Eligard for prostate cancer.    Exam  There were no vitals taken for this visit.  General: awake, alert, no acute distress  Respiratory: nonlabored on room air  Cardiovascular: regular rate and rhythm by peripheral pulse  On exam, sitting comfortably without signs of distress or pain.  Ambulates without antalgic gait or appreciable deficits.    Imaging:   MRI thoracic spine  There are multiple lesions within the thoracic spine, similar to prior MRI on 9/27/2023 but the majority of these areas are less hyperintense, and some appear to have resolved    CT chest  There are no new appreciable masses within the lung or chest wall    Assessment: Moe is a 69-year-old male with history of metastatic prostate cancer approximately 8 years s/p above surgery for above pathology.     Patient continues to do well.  No evidence of recurrence on imaging.  Plan for patient follow-up in 1 year with repeat imaging.  Will continue annual imaging for the next 2 years.  All questions answered.    Plan:   - Follow-up in 1 year with repeat imaging      Seen and discussed with Dr Walker who is in agreement with this assessment and plan    Wesley Anderson MD  Orthopaedic Surgery, PGY4

## 2025-04-23 ENCOUNTER — TRANSCRIBE ORDERS (OUTPATIENT)
Dept: OTHER | Age: 70
End: 2025-04-23

## 2025-04-23 DIAGNOSIS — C61 PROSTATE CANCER (H): Primary | ICD-10-CM

## 2025-04-24 ENCOUNTER — PATIENT OUTREACH (OUTPATIENT)
Dept: ONCOLOGY | Facility: CLINIC | Age: 70
End: 2025-04-24
Payer: COMMERCIAL

## 2025-04-24 NOTE — PROGRESS NOTES
New Patient Oncology Nurse Navigator Note     Referring provider:   Gisella Bustillo MD of      Referred to (specialty): Medical Oncology    Requested provider (if applicable):   Dr. Hull     Date Referral Received:   04/24/25     Evaluation for :   Prostate cancer; [facility] 2nd opinion,  for clinical trial options      Clinical History (per Nurse review of records provided):    Patient with prostate cancer s/p radical Retropubic Prostatectomy, Bilateral Pelvic Lymph Node Dissection 12/28/2020.    Pathology revealed acinar adenocarcinoma, grade group 4 (Thomaston score 3+5 =8), positive margins, 0 of 2 lymph nodes involved with cancer.Stage [id number].      Patient now with metastatic prostate cancer with bone metastases. Started on Eligard in July 2023. Currently on Olaparib since 01/16/2025.    [facility] NGS- Positive for CDK12, TMB Low, MSI-Stable     Treatment :     Eligard/Lupron injections q.6 months-last given 2/3/25    ARPI (androgen receptor pathway inhibitor ) - He initially started on apalutamide and took it for 30 days and switched due to insurance reasons to abiraterone 1000 mg oral daily with prednisone 5 mg oral daily since 10/30/2023.    Zometa IV q 3 months started 1/7/25 for bone metastases.    He is referred to Dr. Hull for discussion of clinical trials.    Pertinent notes, pathology/labs & imaging bookmarked**      Records Location (Care Everywhere, Media, etc.):        I called in attempt to reach patient to discuss referral to oncology.  There was no answer so I left message with our new patient scheduling number: [phone], for patient to call to schedule consult appointment.     I will forward on referral at this time with the following plan-     TENTATIVE PLAN:  1) SCHEDULE: HOLD - Dr. Hull, 5/5 @ [facility] 7-8 AM, [facility], in person  Otherwise first available with Dr. Hull, JERROD, in person @ [facility]  2) Also schedule for a lab draw following the consultation     Graciela Mas RN, BSN  Oncology New Patient  Nurse Navigator   [facility]  [phone]

## 2025-05-01 ENCOUNTER — LAB REQUISITION (OUTPATIENT)
Dept: LAB | Facility: CLINIC | Age: 70
End: 2025-05-01
Payer: COMMERCIAL

## 2025-05-01 PROCEDURE — 88321 CONSLTJ&REPRT SLD PREP ELSWR: CPT | Mod: GC | Performed by: PATHOLOGY

## 2025-05-02 LAB
PATH REPORT.COMMENTS IMP SPEC: ABNORMAL
PATH REPORT.COMMENTS IMP SPEC: ABNORMAL
PATH REPORT.COMMENTS IMP SPEC: YES
PATH REPORT.FINAL DX SPEC: ABNORMAL
PATH REPORT.GROSS SPEC: ABNORMAL
PATH REPORT.MICROSCOPIC SPEC OTHER STN: ABNORMAL
PATH REPORT.RELEVANT HX SPEC: ABNORMAL
PATH REPORT.RELEVANT HX SPEC: ABNORMAL
PATH REPORT.SITE OF ORIGIN SPEC: ABNORMAL

## 2025-05-04 NOTE — PROGRESS NOTES
[facility] Medical Oncology Second Opinion Consultation Note       Date of visit: May 5, 2025    Dear Dr. Bustillo, thank you for referring your patient for a Second Opinion consultation at the [facility].  A brief overview of his oncological history as well as my recommendations follow below.    CC: Second opinion regarding clinical trial options.      HPI:  Felice Boyle is a 70 y.o. old male has been following with Dr. Walker in orthopedics for history of resection left third rib chondrosarcoma and the patient is  7 years out from surgical resection of chondrosarcoma.     Surgery 10/10/2016: Left lateral 3rd rib resection, spinal reconstruction. Pathology revealed chondrosarcoma, grade 2, 4.7 cm, involves medulla and cortex with focal soft tissue invasion, margins negative, pathologic stage pT1.     He also follows with Urology for history of prostate cancer, s/p surgery 12/28/2020-Radical Retropubic Prostatectomy, Bilateral Pelvic Lymph Node Dissection left n sparing.      Pathology revealed acinar adenocarcinoma, grade group 4 ([place] score 3+5 =8), positive margins, 0 of 2 lymph nodes involved with cancer.Stage [id number].     His PSA had increased to 8.5 on 06/12/2023 from prior of 0.7  PET PSMA  07/06/2023 showed findings suspicious for multiple osseous metastases.     Dr. Walker arranged for biopsy of thoracic spine that was positive for metastatic prostate cancer.     ROS:  ROS: 10 point ROS neg other than the symptoms noted above in the HPI.       Tolerating Olaparib, no nausea.    INTERVAL HISTORY:   Moe is presenting with his wife Tenly today. He denies any specific concerns. No nausea, vomiting or fatigue. He has some numbness and tingling of his extremities if he keeps his arms in prolonged or awkward positions but no neurological symptoms otherwise. 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  Generally  feeling well.  A little nervous about port placement and chemo, but he is interested in proceeding.      ONCOLOGY HISTORY:    #Metastatic castration-resistant prostate cancer.   20-prostate cancer s/p radical Retropubic Prostatectomy, Bilateral Pelvic Lymph Node Dissection    Pathology revealed acinar adenocarcinoma, grade group 4 ([place] score 3+5 =8), positive margins, 0 of 2 lymph nodes involved with cancer.Stage [id number].      2023 metastatic prostate cancer with bone metastases. Started on Eligard in 2023. Currently on Olaparib since 2025.    Eligard/Lupron injections q.6 months-last given 2/3/25    ARPI (androgen receptor pathway inhibitor ) - He initially started on apalutamide and took it for 30 days and switched due to insurance reasons to abiraterone 1000 mg oral daily with prednisone 5 mg oral daily since 10/30/2023-25    Zometa IV q 3 months started 25 for bone metastases.     25-Start docetaxel in the [facility] setting with Dr. Bustillo at [facility].  Consideration for [id number] Phase 3 study vs  if eligible at progression on docetaxel.      # History of chondrosarcoma   S/p resection and skeletal reconstruction in .   In remission since 2016 without additional chemotherapies or other treatment.      GENOMIC STUDIES:    [employer] NGS- Positive for CDK12, TMB Low, MSI-Stable     TREATMENT HISTORY:   RP   ADT   Apalutamide x30 days in [facility] settng   Abiraterone 10/30/23-25  Olaparib 25-25  Docetaxel in [facility] setting 25-ongoing     GUIDELINES USED: NCCN    INTENT OF THERAPY: Palliative     CLINICAL TRIALS:  [id number]    [id number] (low slots)    PMH:  BP controlled on medication. No dyspnea, has mild cough from his BP meds, some fatigue. Denies pleuritic chest pain, leg pain or swelling.     Not on any pain medication .Mild chronic left sided upper back pain radiates to left side of chest since his surgical resection of chondrosarcoma.No  neuropathy symptoms.    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 Does not smoke or drink alcohol.    ALLERGIES: NKDA    MEDS:  No current outpatient medications    ROS-Remainder of 14 point ROS reviewed and negative except as in HPI.    PHYSICAL EXAM:  ECOG-PS=0    BP (!) 148/91 (BP Location: Right arm, Patient Position: Sitting, Cuff Size: Adult Large)   Pulse 50   Temp 98.1  F (36.7  C) (Oral)   Resp 18   Wt 117.4 kg (258 lb 14.4 oz)   SpO2 99%   BMI 35.85 kg/m    Exam:  Constitutional: healthy, alert, and no distress  Head: Normocephalic.  Cardiac: No edema or JVD.  Respiratory: normal WOB on RA, no audible wheezing or rhonchi   Gastrointestinal: non-distended  : Deferred  Musculoskeletal: extremities normal- no gross deformities noted, gait normal, and normal muscle tone  Skin: no suspicious lesions or rashes on exposed areas of skin   Neurologic: Gait normal. CNII-XII grossly intact, normal speech.   Psychiatric: mentation appears normal and affect normal/bright    LABS AND IMAGES:  Hemoglobin   Date Value Ref Range Status   10/16/2023 14.5 13.3 - 17.7 g/dL Final   10/13/2016 10.5 (L) 13.3 - 17.7 g/dL Final   10/12/2016 11.3 (L) 13.3 - 17.7 g/dL Final   10/11/2016 10.9 (L) 13.3 - 17.7 g/dL Final     WBC   Date Value Ref Range Status   10/11/2016 11.7 (H) 4.0 - 11.0 10e9/L Final   10/10/2016 7.1 4.0 - 11.0 10e9/L Final   08/23/2016 5.4 4.0 - 11.0 10e9/L Final     WBC Count   Date Value Ref Range Status   10/16/2023 5.3 4.0 - 11.0 10e3/uL Final     Platelet Count   Date Value Ref Range Status   10/16/2023 203 150 - 450 10e3/uL Final   10/11/2016 136 (L) 150 - 450 10e9/L Final   10/10/2016 193 150 - 450 10e9/L Final   08/23/2016 199 150 - 450 10e9/L Final     PSA   Date Value Ref Range Status    10/06/2016 2.03 0 - 4 ug/L Final     Comment:     Assay Method:  Chemiluminescence using Siemens Vista analyzer     AST   Date Value Ref Range Status   10/10/2016 24 0 - 45 U/L Final   08/23/2016 19 0 - 45 U/L Final     ALT   Date Value Ref Range Status   10/10/2016 41 0 - 70 U/L Final   08/23/2016 29 0 - 70 U/L Final     Creatinine   Date Value Ref Range Status   10/10/2016 0.98 0.66 - 1.25 mg/dL Final   08/23/2016 1.02 0.66 - 1.25 mg/dL Final     Urea Nitrogen   Date Value Ref Range Status   10/10/2016 17 7 - 30 mg/dL Final   08/23/2016 19 7 - 30 mg/dL Final       IMPRESSION AND PLAN:    # metastatic, castration-resistant prostate cancer   Moe has a history of chondrosarcoma s/p resection and in remission since 2016 and was initially diagnosed with prostate adenocarcinoma in 2020 with [place] 3+5=8 noted on radical prostatectomy.  0/2 nodes positive and positive margins.  He did not receive salvage RT.  In 2023 he presented with new pain, concerning for recurrence of his chondrosarcoma that was resected in October 2016; however, it was biopsy proven to be prostate adenocarcinoma.  He subsequently was initiated on ADT every 6 months and initially apalutamide x30 days followed by abiraterone in October 2023.  He remained on abiraterone through January 2025 when his PSA began to rise to 7.5.  He had a previously noted CDK12 mutation and was started on olaparib, which he had generally tolerated well with a stable PSA x2 months before rising in April 2025.  He was referred to the [facility] for consideration of clinical trials on 5/5/25 with port insertion planned for 5/7/25.  His referring oncologist, Dr. Bustillo, was planning on docetaxel in the CRPC setting, which I agreed with.  He was scheduled to start docetaxel on 5/12/25 and will return to see me at the end of his 6 cycles or if he continues to have a rising PSA after 2 cycles.      At our initial appointment, we reviewed the many options that he  had available to him, including standard of care Pluvicto which was recently approved in the taxane naive setting by the FDA in March 2025.  We also discussed option for immunotherapies, specifically [id number], which was not currently open at that time and [id number] Phase 3.  We also discussed [id number], an antibody drug conjugate that is open at our site, but with significantly limited slots.  After out discussion, we were in agreement that he could proceed with docetaxel and retain eligibility for multiple studies and standard of care options in the future.  We did review the risk for neurologic toxicity with bispecific antibody therapies and my recommendation to not  a plane if he moves forward to receive any of these therapies.  Being a plane passenger is allowed, we clarified. I communicated my support to start docetaxel with Dr. Bustillo and we will plan on following up in September, earlier if his PSA continues to rise on docetaxel.      PLAN:     I agree with Dr. Bustillo to proceed with docetaxel as planned.   See me back in September for follow-up.   Let me know sooner if your PSA is rising while on docetaxel and I will see you sooner for consideration of a clinical trial.   PCF.org is a great site for patients and families about prostate cancer.    5.  Watch for numbness and tingling in your hands and feet while on docetaxel.  Dr. Bustillo and her team will let you know more about the side effects and when to notify them.     I have seen and personally evaluated the patient today.  I reviewed vitals, medications, laboratory results, and I viewed pertinent imaging studies.  After doing so, I formulated a plan with Dr. Boudreaux as documented in this note.  I have seen and personally evaluated the patient today.  I spent 90 minutes in the care of Felice christina, including an independent face-to-face assessment, review of vitals, medications, laboratory results, and independent review of imaging studies.  The patient was  given the opportunity to ask multiple questions today, all of which were answered to their satisfaction.    The longitudinal plan of care for [facility] was addressed during this visit. Due to the added complexity in care, I will continue to support Felice Boyle in the subsequent management of this condition(s) and with the ongoing continuity of care of this condition(s).      Clifton Hull MD, PhD   of Medicine   Oncology/BMT/Cellular Therapies

## 2025-05-05 ENCOUNTER — ONCOLOGY VISIT (OUTPATIENT)
Dept: ONCOLOGY | Facility: CLINIC | Age: 70
End: 2025-05-05
Attending: INTERNAL MEDICINE
Payer: COMMERCIAL

## 2025-05-05 VITALS
WEIGHT: 258.9 LBS | BODY MASS INDEX: 35.85 KG/M2 | TEMPERATURE: 98.1 F | HEART RATE: 50 BPM | SYSTOLIC BLOOD PRESSURE: 148 MMHG | DIASTOLIC BLOOD PRESSURE: 91 MMHG | RESPIRATION RATE: 18 BRPM | OXYGEN SATURATION: 99 %

## 2025-05-05 DIAGNOSIS — C79.51 PROSTATE CANCER METASTATIC TO BONE (H): ICD-10-CM

## 2025-05-05 DIAGNOSIS — C61 HORMONE RESISTANT PROSTATE CANCER (H): Primary | ICD-10-CM

## 2025-05-05 DIAGNOSIS — C61 PROSTATE CANCER METASTATIC TO BONE (H): ICD-10-CM

## 2025-05-05 DIAGNOSIS — Z19.2 HORMONE RESISTANT PROSTATE CANCER (H): Primary | ICD-10-CM

## 2025-05-05 DIAGNOSIS — C61 METASTATIC CASTRATION-RESISTANT ADENOCARCINOMA OF PROSTATE (H): ICD-10-CM

## 2025-05-05 DIAGNOSIS — Z19.2 METASTATIC CASTRATION-RESISTANT ADENOCARCINOMA OF PROSTATE (H): ICD-10-CM

## 2025-05-05 PROCEDURE — G2211 COMPLEX E/M VISIT ADD ON: HCPCS | Performed by: STUDENT IN AN ORGANIZED HEALTH CARE EDUCATION/TRAINING PROGRAM

## 2025-05-05 PROCEDURE — 99213 OFFICE O/P EST LOW 20 MIN: CPT | Performed by: STUDENT IN AN ORGANIZED HEALTH CARE EDUCATION/TRAINING PROGRAM

## 2025-05-05 PROCEDURE — 99417 PROLNG OP E/M EACH 15 MIN: CPT | Performed by: STUDENT IN AN ORGANIZED HEALTH CARE EDUCATION/TRAINING PROGRAM

## 2025-05-05 PROCEDURE — 99205 OFFICE O/P NEW HI 60 MIN: CPT | Mod: GC | Performed by: STUDENT IN AN ORGANIZED HEALTH CARE EDUCATION/TRAINING PROGRAM

## 2025-05-05 RX ORDER — LISINOPRIL AND HYDROCHLOROTHIAZIDE 10; 12.5 MG/1; MG/1
1 TABLET ORAL DAILY
COMMUNITY
Start: 2024-12-20 | End: 2025-12-20

## 2025-05-05 ASSESSMENT — PAIN SCALES - GENERAL: PAINLEVEL_OUTOF10: NO PAIN (0)

## 2025-05-05 NOTE — LETTER
5/5/2025      Felice Boyle  [address]  [place] [address]      Dear Colleague,    Thank you for referring your patient, Felice Boyle, to the [facility]. Please see a copy of my visit note below.      [facility] Medical Oncology Second Opinion Consultation Note       Date of visit: May 5, 2025    Dear Dr. Bustillo, thank you for referring your patient for a Second Opinion consultation at the [facility].  A brief overview of his oncological history as well as my recommendations follow below.    CC: Second opinion regarding clinical trial options.      HPI:  Felice Boyle is a 70 y.o. old male has been following with Dr. Walker in orthopedics for history of resection left third rib chondrosarcoma and the patient is  7 years out from surgical resection of chondrosarcoma.     Surgery 10/10/2016: Left lateral 3rd rib resection, spinal reconstruction. Pathology revealed chondrosarcoma, grade 2, 4.7 cm, involves medulla and cortex with focal soft tissue invasion, margins negative, pathologic stage pT1.     He also follows with Urology for history of prostate cancer, s/p surgery 12/28/2020-Radical Retropubic Prostatectomy, Bilateral Pelvic Lymph Node Dissection left n sparing.      Pathology revealed acinar adenocarcinoma, grade group 4 ([place] score 3+5 =8), positive margins, 0 of 2 lymph nodes involved with cancer.Stage [id number].     His PSA had increased to 8.5 on 06/12/2023 from prior of 0.7  PET PSMA  07/06/2023 showed findings suspicious for multiple osseous metastases.     Dr. Walker arranged for biopsy of thoracic spine that was positive for metastatic prostate cancer.     ROS:  ROS: 10 point ROS neg other than the symptoms noted above in the HPI.       Tolerating Olaparib, no nausea.    INTERVAL HISTORY:   Moe is presenting with his wife Tenly today. He denies any specific concerns. No nausea, vomiting or fatigue. He has some  numbness and tingling of his extremities if he keeps his arms in prolonged or awkward positions but no neurological symptoms otherwise. 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  Generally feeling well.  A little nervous about port placement and chemo, but he is interested in proceeding.      ONCOLOGY HISTORY:    #Metastatic castration-resistant prostate cancer.   20-prostate cancer s/p radical Retropubic Prostatectomy, Bilateral Pelvic Lymph Node Dissection    Pathology revealed acinar adenocarcinoma, grade group 4 (Viviane score 3+5 =8), positive margins, 0 of 2 lymph nodes involved with cancer.Stage [id number].      2023 metastatic prostate cancer with bone metastases. Started on Eligard in 2023. Currently on Olaparib since 2025.    Eligard/Lupron injections q.6 months-last given 2/3/25    ARPI (androgen receptor pathway inhibitor ) - He initially started on apalutamide and took it for 30 days and switched due to insurance reasons to abiraterone 1000 mg oral daily with prednisone 5 mg oral daily since 10/30/2023-25    Zometa IV q 3 months started 25 for bone metastases.     25-Start docetaxel in the [facility] setting with Dr. Bustillo at [facility].  Consideration for [id number] Phase 3 study vs  if eligible at progression on docetaxel.      # History of chondrosarcoma   S/p resection and skeletal reconstruction in .   In remission since 2016 without additional chemotherapies or other treatment.      GENOMIC STUDIES:    [employer] NGS- Positive for CDK12, TMB Low, MSI-Stable     TREATMENT HISTORY:   RP   ADT   Apalutamide x30 days in [facility] settng   Abiraterone 10/30/23-25  Olaparib 25-25  Docetaxel in [facility] setting 25-ongoing     GUIDELINES USED: NCCN    INTENT OF THERAPY: Palliative     CLINICAL TRIALS:  [id number]    [id number] (low slots)    PMH:  BP controlled on medication. No dyspnea, has mild  cough from his BP meds, some fatigue. Denies pleuritic chest pain, leg pain or swelling.     Not on any pain medication .Mild chronic left sided upper back pain radiates to left side of chest since his surgical resection of chondrosarcoma.No neuropathy symptoms.    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 Does not smoke or drink alcohol.    ALLERGIES: NKDA    MEDS:  No current outpatient medications    ROS-Remainder of 14 point ROS reviewed and negative except as in HPI.    PHYSICAL EXAM:  ECOG-PS=0    BP (!) 148/91 (BP Location: Right arm, Patient Position: Sitting, Cuff Size: Adult Large)   Pulse 50   Temp 98.1  F (36.7  C) (Oral)   Resp 18   Wt 117.4 kg (258 lb 14.4 oz)   SpO2 99%   BMI 35.85 kg/m    Exam:  Constitutional: healthy, alert, and no distress  Head: Normocephalic.  Cardiac: No edema or JVD.  Respiratory: normal WOB on RA, no audible wheezing or rhonchi   Gastrointestinal: non-distended  : Deferred  Musculoskeletal: extremities normal- no gross deformities noted, gait normal, and normal muscle tone  Skin: no suspicious lesions or rashes on exposed areas of skin   Neurologic: Gait normal. CNII-XII grossly intact, normal speech.   Psychiatric: mentation appears normal and affect normal/bright    LABS AND IMAGES:  Hemoglobin   Date Value Ref Range Status   10/16/2023 14.5 13.3 - 17.7 g/dL Final   10/13/2016 10.5 (L) 13.3 - 17.7 g/dL Final   10/12/2016 11.3 (L) 13.3 - 17.7 g/dL Final   10/11/2016 10.9 (L) 13.3 - 17.7 g/dL Final     WBC   Date Value Ref Range Status   10/11/2016 11.7 (H) 4.0 - 11.0 10e9/L Final   10/10/2016 7.1 4.0 - 11.0 10e9/L Final   08/23/2016 5.4 4.0 - 11.0 10e9/L Final     WBC Count   Date Value Ref Range Status   10/16/2023 5.3 4.0 - 11.0 10e3/uL Final     Platelet  Count   Date Value Ref Range Status   10/16/2023 203 150 - 450 10e3/uL Final   10/11/2016 136 (L) 150 - 450 10e9/L Final   10/10/2016 193 150 - 450 10e9/L Final   08/23/2016 199 150 - 450 10e9/L Final     PSA   Date Value Ref Range Status   10/06/2016 2.03 0 - 4 ug/L Final     Comment:     Assay Method:  Chemiluminescence using Siemens Vista analyzer     AST   Date Value Ref Range Status   10/10/2016 24 0 - 45 U/L Final   08/23/2016 19 0 - 45 U/L Final     ALT   Date Value Ref Range Status   10/10/2016 41 0 - 70 U/L Final   08/23/2016 29 0 - 70 U/L Final     Creatinine   Date Value Ref Range Status   10/10/2016 0.98 0.66 - 1.25 mg/dL Final   08/23/2016 1.02 0.66 - 1.25 mg/dL Final     Urea Nitrogen   Date Value Ref Range Status   10/10/2016 17 7 - 30 mg/dL Final   08/23/2016 19 7 - 30 mg/dL Final       IMPRESSION AND PLAN:    # metastatic, castration-resistant prostate cancer   Moe has a history of chondrosarcoma s/p resection and in remission since 2016 and was initially diagnosed with prostate adenocarcinoma in 2020 with [place] 3+5=8 noted on radical prostatectomy.  0/2 nodes positive and positive margins.  He did not receive salvage RT.  In 2023 he presented with new pain, concerning for recurrence of his chondrosarcoma that was resected in October 2016; however, it was biopsy proven to be prostate adenocarcinoma.  He subsequently was initiated on ADT every 6 months and initially apalutamide x30 days followed by abiraterone in October 2023.  He remained on abiraterone through January 2025 when his PSA began to rise to 7.5.  He had a previously noted CDK12 mutation and was started on olaparib, which he had generally tolerated well with a stable PSA x2 months before rising in April 2025.  He was referred to the [facility] for consideration of clinical trials on 5/5/25 with port insertion planned for 5/7/25.  His referring oncologist, Dr. Bustillo, was planning on docetaxel in the CRPC setting, which I  agreed with.  He was scheduled to start docetaxel on 5/12/25 and will return to see me at the end of his 6 cycles or if he continues to have a rising PSA after 2 cycles.      At our initial appointment, we reviewed the many options that he had available to him, including standard of care Pluvicto which was recently approved in the taxane naive setting by the FDA in March 2025.  We also discussed option for immunotherapies, specifically [id number], which was not currently open at that time and [id number] Phase 3.  We also discussed [id number], an antibody drug conjugate that is open at our site, but with significantly limited slots.  After out discussion, we were in agreement that he could proceed with docetaxel and retain eligibility for multiple studies and standard of care options in the future.  We did review the risk for neurologic toxicity with bispecific antibody therapies and my recommendation to not  a plane if he moves forward to receive any of these therapies.  Being a plane passenger is allowed, we clarified. I communicated my support to start docetaxel with Dr. Bustillo and we will plan on following up in September, earlier if his PSA continues to rise on docetaxel.      PLAN:     I agree with Dr. Bustillo to proceed with docetaxel as planned.   See me back in September for follow-up.   Let me know sooner if your PSA is rising while on docetaxel and I will see you sooner for consideration of a clinical trial.   PCF.org is a great site for patients and families about prostate cancer.    5.  Watch for numbness and tingling in your hands and feet while on docetaxel.  Dr. Bustillo and her team will let you know more about the side effects and when to notify them.     I have seen and personally evaluated the patient today.  I reviewed vitals, medications, laboratory results, and I viewed pertinent imaging studies.  After doing so, I formulated a plan with Dr. Boudreaux as documented in this note.  I have seen and personally  evaluated the patient today.  I spent 90 minutes in the care of Felice vasquez, including an independent face-to-face assessment, review of vitals, medications, laboratory results, and independent review of imaging studies.  The patient was given the opportunity to ask multiple questions today, all of which were answered to their satisfaction.    The longitudinal plan of care for [facility] was addressed during this visit. Due to the added complexity in care, I will continue to support Felice Boyle in the subsequent management of this condition(s) and with the ongoing continuity of care of this condition(s).      Clifton Hull MD, PhD   of Medicine   Oncology/BMT/Cellular Therapies        Again, thank you for allowing me to participate in the care of your patient.        Sincerely,        Clifton Hull MD    Electronically signed

## 2025-05-05 NOTE — NURSING NOTE
"Oncology Rooming Note    May 5, 2025 6:59 AM   Felice Boyle is a 70 year old male who presents for:    Chief Complaint   Patient presents with    Oncology Clinic Visit     Prostate cancer     Initial Vitals: BP (!) 148/91 (BP Location: Right arm, Patient Position: Sitting, Cuff Size: Adult Large)   Pulse 50   Temp 98.1  F (36.7  C) (Oral)   Resp 18   Wt 117.4 kg (258 lb 14.4 oz)   SpO2 99%   BMI 35.85 kg/m   Estimated body mass index is 35.85 kg/m  as calculated from the following:    Height as of 10/16/23: 1.81 m (5' 11.26\").    Weight as of this encounter: 117.4 kg (258 lb 14.4 oz). Body surface area is 2.43 meters squared.  No Pain (0) Comment: Data Unavailable   No LMP for male patient.  Allergies reviewed: Yes  Medications reviewed: Yes    Medications: Medication refills not needed today.  Pharmacy name entered into [facility]:    [id number] [place] [facility] [address] [id number] - [place], [place] - [address] [facility] [address] [facility] - [place], [place] - [address]     Frailty Screening:   Is the patient here for a new oncology consult visit in cancer care? 1. Yes. Over the past month, have you experienced difficulty or required a caregiver to assist with:   1. Balance, walking or general mobility (including any falls)? NO  2. Completion of self-care tasks such as bathing, dressing, toileting, grooming/hygiene?  NO  3. Concentration or memory that affects your daily life?  NO     PHQ9:  Did this patient require a PHQ9?: No      Clinical concerns: none.      Clifton Jaffe"

## 2025-05-05 NOTE — PATIENT INSTRUCTIONS
Moe,   I agree with Dr. Bustillo to proceed with docetaxel as planned.   See me back in September for follow-up.   Let me know sooner if your PSA is rising while on docetaxel and I will see you sooner for consideration of a clinical trial.   PCF.org is a great site for patients and families about prostate cancer.    5.  Watch for numbness and tingling in your hands and feet while on docetaxel.  Dr. Bustillo and her team will let you know more about the side effects and when to notify them.

## 2025-07-15 DIAGNOSIS — C41.9 CHONDROSARCOMA (H): Primary | ICD-10-CM

## 2025-07-15 DIAGNOSIS — C61 PROSTATE CANCER (H): ICD-10-CM

## 2025-07-15 DIAGNOSIS — C79.51 CANCER, METASTATIC TO BONE (H): ICD-10-CM

## 2025-08-16 ENCOUNTER — HEALTH MAINTENANCE LETTER (OUTPATIENT)
Age: 70
End: 2025-08-16

## (undated) RX ORDER — LIDOCAINE HYDROCHLORIDE 10 MG/ML
INJECTION, SOLUTION EPIDURAL; INFILTRATION; INTRACAUDAL; PERINEURAL
Status: DISPENSED
Start: 2023-10-16

## (undated) RX ORDER — SODIUM CHLORIDE 9 MG/ML
INJECTION, SOLUTION INTRAVENOUS
Status: DISPENSED
Start: 2023-10-16

## (undated) RX ORDER — FENTANYL CITRATE 50 UG/ML
INJECTION, SOLUTION INTRAMUSCULAR; INTRAVENOUS
Status: DISPENSED
Start: 2023-10-16